# Patient Record
Sex: MALE | Race: WHITE | Employment: FULL TIME | URBAN - NONMETROPOLITAN AREA
[De-identification: names, ages, dates, MRNs, and addresses within clinical notes are randomized per-mention and may not be internally consistent; named-entity substitution may affect disease eponyms.]

---

## 2024-08-20 ENCOUNTER — APPOINTMENT (OUTPATIENT)
Dept: CT IMAGING | Age: 64
DRG: 392 | End: 2024-08-20
Payer: COMMERCIAL

## 2024-08-20 ENCOUNTER — HOSPITAL ENCOUNTER (INPATIENT)
Age: 64
LOS: 5 days | Discharge: HOME | DRG: 392 | End: 2024-08-25
Attending: STUDENT IN AN ORGANIZED HEALTH CARE EDUCATION/TRAINING PROGRAM
Payer: COMMERCIAL

## 2024-08-20 ENCOUNTER — APPOINTMENT (OUTPATIENT)
Dept: ULTRASOUND IMAGING | Age: 64
DRG: 392 | End: 2024-08-20
Payer: COMMERCIAL

## 2024-08-20 DIAGNOSIS — R19.7 NAUSEA VOMITING AND DIARRHEA: Primary | ICD-10-CM

## 2024-08-20 DIAGNOSIS — R11.2 NAUSEA VOMITING AND DIARRHEA: Primary | ICD-10-CM

## 2024-08-20 DIAGNOSIS — R17 JAUNDICE: ICD-10-CM

## 2024-08-20 DIAGNOSIS — K80.20 CALCULUS OF GALLBLADDER WITHOUT CHOLECYSTITIS WITHOUT OBSTRUCTION: ICD-10-CM

## 2024-08-20 DIAGNOSIS — E11.9 TYPE 2 DIABETES MELLITUS WITHOUT COMPLICATION, WITHOUT LONG-TERM CURRENT USE OF INSULIN (HCC): ICD-10-CM

## 2024-08-20 PROBLEM — E87.1 HYPONATREMIA: Status: ACTIVE | Noted: 2024-08-20

## 2024-08-20 LAB
ALBUMIN SERPL BCG-MCNC: 3.9 G/DL (ref 3.5–5.1)
ALP SERPL-CCNC: 203 U/L (ref 38–126)
ALT SERPL W/O P-5'-P-CCNC: 256 U/L (ref 11–66)
ANION GAP SERPL CALC-SCNC: 16 MEQ/L (ref 8–16)
ANISOCYTOSIS BLD QL SMEAR: PRESENT
APAP SERPL-MCNC: < 5 UG/ML (ref 0–20)
APTT PPP: 32.3 SECONDS (ref 22–38)
AST SERPL-CCNC: 81 U/L (ref 5–40)
BACTERIA URNS QL MICRO: ABNORMAL /HPF
BASOPHILS ABSOLUTE: 0 THOU/MM3 (ref 0–0.1)
BASOPHILS NFR BLD AUTO: 0.2 %
BILIRUB CONJ SERPL-MCNC: 3.8 MG/DL (ref 0.1–13.8)
BILIRUB SERPL-MCNC: 4.8 MG/DL (ref 0.3–1.2)
BILIRUB UR QL STRIP.AUTO: ABNORMAL
BUN SERPL-MCNC: 34 MG/DL (ref 7–22)
C CAYETANENSIS DNA SPEC QL NAA+PROBE: NOT DETECTED
CALCIUM SERPL-MCNC: 8.9 MG/DL (ref 8.5–10.5)
CAMPY SP DNA.DIARRHEA STL QL NAA+PROBE: NOT DETECTED
CASTS #/AREA URNS LPF: ABNORMAL /LPF
CASTS 2: ABNORMAL /LPF
CHARACTER UR: CLEAR
CHLORIDE SERPL-SCNC: 87 MEQ/L (ref 98–111)
CK SERPL-CCNC: 45 U/L (ref 55–170)
CO2 SERPL-SCNC: 23 MEQ/L (ref 23–33)
COLOR, UA: ABNORMAL
CREAT SERPL-MCNC: 1.8 MG/DL (ref 0.4–1.2)
CREAT UR-MCNC: 64.6 MG/DL
CREAT UR-MCNC: 64.7 MG/DL
CRP SERPL-MCNC: 22.5 MG/DL (ref 0–1)
CRYPTOSP DNA SPEC QL NAA+PROBE: NOT DETECTED
CRYSTALS URNS MICRO: ABNORMAL
DEPRECATED MEAN GLUCOSE BLD GHB EST-ACNC: 168 MG/DL (ref 70–126)
DEPRECATED RDW RBC AUTO: 34.9 FL (ref 35–45)
E COLI O157H7 DNA SPEC QL NAA+PROBE: NORMAL
E HISTOLYT DNA SPEC QL NAA+PROBE: NOT DETECTED
EAEC PAA PLAS AGGR+AATA ST NAA+NON-PRB: NOT DETECTED
EC STX1+STX2 + H7 FLIC SPEC NAA+PROBE: NOT DETECTED
EKG ATRIAL RATE: 84 BPM
EKG P AXIS: 17 DEGREES
EKG P-R INTERVAL: 148 MS
EKG Q-T INTERVAL: 414 MS
EKG QRS DURATION: 174 MS
EKG QTC CALCULATION (BAZETT): 489 MS
EKG R AXIS: 126 DEGREES
EKG T AXIS: 4 DEGREES
EKG VENTRICULAR RATE: 84 BPM
EOSINOPHIL NFR BLD AUTO: 0.4 %
EOSINOPHILS ABSOLUTE: 0.1 THOU/MM3 (ref 0–0.4)
EPEC EAE GENE STL QL NAA+NON-PROBE: NOT DETECTED
EPITHELIAL CELLS, UA: ABNORMAL /HPF
ERYTHROCYTE [DISTWIDTH] IN BLOOD BY AUTOMATED COUNT: 15.9 % (ref 11.5–14.5)
ETEC LTA+ST1A+ST1B TOX ST NAA+NON-PROBE: NOT DETECTED
FERRITIN SERPL IA-MCNC: 891 NG/ML (ref 22–322)
FLUAV RNA RESP QL NAA+PROBE: NOT DETECTED
FLUBV RNA RESP QL NAA+PROBE: NOT DETECTED
G LAMBLIA DNA SPEC QL NAA+PROBE: NOT DETECTED
GFR SERPL CREATININE-BSD FRML MDRD: 42 ML/MIN/1.73M2
GGT SERPL-CCNC: 196 U/L (ref 8–69)
GLUCOSE SERPL-MCNC: 299 MG/DL (ref 70–108)
GLUCOSE UR QL STRIP.AUTO: NEGATIVE MG/DL
HADV DNA SPEC QL NAA+PROBE: NOT DETECTED
HASTV RNA SPEC QL NAA+PROBE: NOT DETECTED
HAV IGM SER QL: NEGATIVE
HBA1C MFR BLD HPLC: 7.6 % (ref 4.4–6.4)
HBV CORE IGM SERPL QL IA: NEGATIVE
HBV SURFACE AG SERPL QL IA: NEGATIVE
HCT VFR BLD AUTO: 34.6 % (ref 42–52)
HCV IGG SERPL QL IA: NEGATIVE
HGB BLD-MCNC: 11.1 GM/DL (ref 14–18)
HGB UR QL STRIP.AUTO: NEGATIVE
IMM GRANULOCYTES # BLD AUTO: 0.06 THOU/MM3 (ref 0–0.07)
IMM GRANULOCYTES NFR BLD AUTO: 0.4 %
INR PPP: 1.31 (ref 0.85–1.13)
KETONES UR QL STRIP.AUTO: NEGATIVE
LACTIC ACID, SEPSIS: 2.5 MMOL/L (ref 0.5–1.9)
LDH SERPL L TO P-CCNC: 204 U/L (ref 100–190)
LIPASE SERPL-CCNC: 92.7 U/L (ref 5.6–51.3)
LIPASE SERPL-CCNC: 94.3 U/L (ref 5.6–51.3)
LYMPHOCYTES ABSOLUTE: 1 THOU/MM3 (ref 1–4.8)
LYMPHOCYTES NFR BLD AUTO: 7.2 %
MAGNESIUM SERPL-MCNC: 2.4 MG/DL (ref 1.6–2.4)
MCH RBC QN AUTO: 20.3 PG (ref 26–33)
MCHC RBC AUTO-ENTMCNC: 32.1 GM/DL (ref 32.2–35.5)
MCV RBC AUTO: 63.4 FL (ref 80–94)
MICROCYTES BLD QL SMEAR: PRESENT
MISCELLANEOUS 2: ABNORMAL
MONOCYTES ABSOLUTE: 1.1 THOU/MM3 (ref 0.4–1.3)
MONOCYTES NFR BLD AUTO: 7.5 %
NEUTROPHILS ABSOLUTE: 11.8 THOU/MM3 (ref 1.8–7.7)
NEUTROPHILS NFR BLD AUTO: 84.3 %
NITRITE UR QL STRIP: NEGATIVE
NOROVIRUS GI + GII RNA STL NAA+PROBE: NOT DETECTED
NRBC BLD AUTO-RTO: 0 /100 WBC
OSMOLALITY SERPL CALC.SUM OF ELEC: 272.1 MOSMOL/KG (ref 275–300)
OSMOLALITY SERPL: 288 MOSMOL/KG (ref 275–295)
OSMOLALITY UR: 314 MOSMOL/KG (ref 250–750)
P SHIGELLOIDES DNA STL QL NAA+PROBE: NOT DETECTED
PH UR STRIP.AUTO: 5.5 [PH] (ref 5–9)
PHOSPHATE SERPL-MCNC: 2.3 MG/DL (ref 2.4–4.7)
PLATELET # BLD AUTO: 209 THOU/MM3 (ref 130–400)
PMV BLD AUTO: 11.2 FL (ref 9.4–12.4)
POIKILOCYTES: ABNORMAL
POTASSIUM SERPL-SCNC: 4.1 MEQ/L (ref 3.5–5.2)
PROCALCITONIN SERPL IA-MCNC: 2.19 NG/ML (ref 0.01–0.09)
PROT SERPL-MCNC: 7.7 G/DL (ref 6.1–8)
PROT UR STRIP.AUTO-MCNC: ABNORMAL MG/DL
PROT UR-MCNC: 13.6 MG/DL
RBC # BLD AUTO: 5.46 MILL/MM3 (ref 4.7–6.1)
RBC URINE: ABNORMAL /HPF
RENAL EPI CELLS #/AREA URNS HPF: ABNORMAL /[HPF]
RV RNA SPEC QL NAA+PROBE: NOT DETECTED
SALICYLATES SERPL-MCNC: < 0.3 MG/DL (ref 2–10)
SALMONELLA DNA SPEC QL NAA+PROBE: NOT DETECTED
SAPOVIRUS RNA SPEC QL NAA+PROBE: NOT DETECTED
SARS-COV-2 RNA RESP QL NAA+PROBE: NOT DETECTED
SCAN OF BLOOD SMEAR: NORMAL
SHIGELLA SP+EIEC IPAH ST NAA+NON-PROBE: NOT DETECTED
SODIUM SERPL-SCNC: 126 MEQ/L (ref 135–145)
SODIUM SERPL-SCNC: 133 MEQ/L (ref 135–145)
SODIUM UR-SCNC: < 20 MEQ/L
SP GR UR REFRACT.AUTO: 1.03 (ref 1–1.03)
T4 FREE SERPL-MCNC: 1.2 NG/DL (ref 0.93–1.68)
TSH SERPL DL<=0.005 MIU/L-ACNC: 2.24 UIU/ML (ref 0.4–4.2)
UROBILINOGEN, URINE: 1 EU/DL (ref 0–1)
UUN 24H UR-MCNC: 500 MG/DL
V CHOLERAE DNA SPEC QL NAA+PROBE: NOT DETECTED
VIBRIO DNA SPEC NAA+PROBE: NOT DETECTED
WBC # BLD AUTO: 14 THOU/MM3 (ref 4.8–10.8)
WBC #/AREA URNS HPF: ABNORMAL /HPF
WBC #/AREA URNS HPF: NEGATIVE /[HPF]
Y ENTERO RECN STL QL NAA+PROBE: NOT DETECTED
YEAST LIKE FUNGI URNS QL MICRO: ABNORMAL

## 2024-08-20 PROCEDURE — 83605 ASSAY OF LACTIC ACID: CPT

## 2024-08-20 PROCEDURE — 2500000003 HC RX 250 WO HCPCS

## 2024-08-20 PROCEDURE — 2580000003 HC RX 258: Performed by: EMERGENCY MEDICINE

## 2024-08-20 PROCEDURE — 93010 ELECTROCARDIOGRAM REPORT: CPT | Performed by: INTERNAL MEDICINE

## 2024-08-20 PROCEDURE — 82728 ASSAY OF FERRITIN: CPT

## 2024-08-20 PROCEDURE — 83690 ASSAY OF LIPASE: CPT

## 2024-08-20 PROCEDURE — 81001 URINALYSIS AUTO W/SCOPE: CPT

## 2024-08-20 PROCEDURE — 87040 BLOOD CULTURE FOR BACTERIA: CPT

## 2024-08-20 PROCEDURE — 84145 PROCALCITONIN (PCT): CPT

## 2024-08-20 PROCEDURE — 6360000002 HC RX W HCPCS: Performed by: EMERGENCY MEDICINE

## 2024-08-20 PROCEDURE — 87086 URINE CULTURE/COLONY COUNT: CPT

## 2024-08-20 PROCEDURE — 2060000000 HC ICU INTERMEDIATE R&B

## 2024-08-20 PROCEDURE — 80179 DRUG ASSAY SALICYLATE: CPT

## 2024-08-20 PROCEDURE — 82570 ASSAY OF URINE CREATININE: CPT

## 2024-08-20 PROCEDURE — 84540 ASSAY OF URINE/UREA-N: CPT

## 2024-08-20 PROCEDURE — 99205 OFFICE O/P NEW HI 60 MIN: CPT

## 2024-08-20 PROCEDURE — 93005 ELECTROCARDIOGRAM TRACING: CPT | Performed by: STUDENT IN AN ORGANIZED HEALTH CARE EDUCATION/TRAINING PROGRAM

## 2024-08-20 PROCEDURE — 83036 HEMOGLOBIN GLYCOSYLATED A1C: CPT

## 2024-08-20 PROCEDURE — 6370000000 HC RX 637 (ALT 250 FOR IP): Performed by: STUDENT IN AN ORGANIZED HEALTH CARE EDUCATION/TRAINING PROGRAM

## 2024-08-20 PROCEDURE — 6360000004 HC RX CONTRAST MEDICATION: Performed by: STUDENT IN AN ORGANIZED HEALTH CARE EDUCATION/TRAINING PROGRAM

## 2024-08-20 PROCEDURE — 86140 C-REACTIVE PROTEIN: CPT

## 2024-08-20 PROCEDURE — 85025 COMPLETE CBC W/AUTO DIFF WBC: CPT

## 2024-08-20 PROCEDURE — 99223 1ST HOSP IP/OBS HIGH 75: CPT | Performed by: STUDENT IN AN ORGANIZED HEALTH CARE EDUCATION/TRAINING PROGRAM

## 2024-08-20 PROCEDURE — 83615 LACTATE (LD) (LDH) ENZYME: CPT

## 2024-08-20 PROCEDURE — 80143 DRUG ASSAY ACETAMINOPHEN: CPT

## 2024-08-20 PROCEDURE — 83930 ASSAY OF BLOOD OSMOLALITY: CPT

## 2024-08-20 PROCEDURE — 83735 ASSAY OF MAGNESIUM: CPT

## 2024-08-20 PROCEDURE — 99204 OFFICE O/P NEW MOD 45 MIN: CPT

## 2024-08-20 PROCEDURE — 84439 ASSAY OF FREE THYROXINE: CPT

## 2024-08-20 PROCEDURE — 84300 ASSAY OF URINE SODIUM: CPT

## 2024-08-20 PROCEDURE — 2580000003 HC RX 258: Performed by: STUDENT IN AN ORGANIZED HEALTH CARE EDUCATION/TRAINING PROGRAM

## 2024-08-20 PROCEDURE — 84443 ASSAY THYROID STIM HORMONE: CPT

## 2024-08-20 PROCEDURE — 84156 ASSAY OF PROTEIN URINE: CPT

## 2024-08-20 PROCEDURE — 87636 SARSCOV2 & INF A&B AMP PRB: CPT

## 2024-08-20 PROCEDURE — 84100 ASSAY OF PHOSPHORUS: CPT

## 2024-08-20 PROCEDURE — 99285 EMERGENCY DEPT VISIT HI MDM: CPT

## 2024-08-20 PROCEDURE — 76705 ECHO EXAM OF ABDOMEN: CPT

## 2024-08-20 PROCEDURE — 82248 BILIRUBIN DIRECT: CPT

## 2024-08-20 PROCEDURE — 74177 CT ABD & PELVIS W/CONTRAST: CPT

## 2024-08-20 PROCEDURE — 83935 ASSAY OF URINE OSMOLALITY: CPT

## 2024-08-20 PROCEDURE — 36415 COLL VENOUS BLD VENIPUNCTURE: CPT

## 2024-08-20 PROCEDURE — 82550 ASSAY OF CK (CPK): CPT

## 2024-08-20 PROCEDURE — 87641 MR-STAPH DNA AMP PROBE: CPT

## 2024-08-20 PROCEDURE — 85730 THROMBOPLASTIN TIME PARTIAL: CPT

## 2024-08-20 PROCEDURE — 6360000002 HC RX W HCPCS: Performed by: STUDENT IN AN ORGANIZED HEALTH CARE EDUCATION/TRAINING PROGRAM

## 2024-08-20 PROCEDURE — 87507 IADNA-DNA/RNA PROBE TQ 12-25: CPT

## 2024-08-20 PROCEDURE — 80053 COMPREHEN METABOLIC PANEL: CPT

## 2024-08-20 PROCEDURE — 84295 ASSAY OF SERUM SODIUM: CPT

## 2024-08-20 PROCEDURE — 80074 ACUTE HEPATITIS PANEL: CPT

## 2024-08-20 PROCEDURE — 82977 ASSAY OF GGT: CPT

## 2024-08-20 PROCEDURE — 85610 PROTHROMBIN TIME: CPT

## 2024-08-20 PROCEDURE — 2580000003 HC RX 258

## 2024-08-20 PROCEDURE — 83010 ASSAY OF HAPTOGLOBIN QUANT: CPT

## 2024-08-20 RX ORDER — SODIUM CHLORIDE 9 MG/ML
INJECTION, SOLUTION INTRAVENOUS CONTINUOUS
Status: DISCONTINUED | OUTPATIENT
Start: 2024-08-20 | End: 2024-08-20

## 2024-08-20 RX ORDER — METRONIDAZOLE 500 MG/100ML
500 INJECTION, SOLUTION INTRAVENOUS ONCE
Status: COMPLETED | OUTPATIENT
Start: 2024-08-20 | End: 2024-08-20

## 2024-08-20 RX ORDER — IOPAMIDOL 755 MG/ML
80 INJECTION, SOLUTION INTRAVASCULAR
Status: COMPLETED | OUTPATIENT
Start: 2024-08-20 | End: 2024-08-20

## 2024-08-20 RX ORDER — ONDANSETRON 4 MG/1
4 TABLET, ORALLY DISINTEGRATING ORAL EVERY 8 HOURS PRN
Status: DISCONTINUED | OUTPATIENT
Start: 2024-08-20 | End: 2024-08-25 | Stop reason: HOSPADM

## 2024-08-20 RX ORDER — POTASSIUM CHLORIDE 1500 MG/1
40 TABLET, EXTENDED RELEASE ORAL PRN
Status: DISCONTINUED | OUTPATIENT
Start: 2024-08-20 | End: 2024-08-25 | Stop reason: HOSPADM

## 2024-08-20 RX ORDER — ACETAMINOPHEN 650 MG/1
650 SUPPOSITORY RECTAL EVERY 6 HOURS PRN
Status: DISCONTINUED | OUTPATIENT
Start: 2024-08-20 | End: 2024-08-25 | Stop reason: HOSPADM

## 2024-08-20 RX ORDER — ACETAMINOPHEN 325 MG/1
650 TABLET ORAL EVERY 6 HOURS PRN
Status: DISCONTINUED | OUTPATIENT
Start: 2024-08-20 | End: 2024-08-25 | Stop reason: HOSPADM

## 2024-08-20 RX ORDER — ENOXAPARIN SODIUM 100 MG/ML
40 INJECTION SUBCUTANEOUS EVERY 24 HOURS
Status: DISCONTINUED | OUTPATIENT
Start: 2024-08-20 | End: 2024-08-25 | Stop reason: HOSPADM

## 2024-08-20 RX ORDER — 0.9 % SODIUM CHLORIDE 0.9 %
3000 INTRAVENOUS SOLUTION INTRAVENOUS ONCE
Status: COMPLETED | OUTPATIENT
Start: 2024-08-20 | End: 2024-08-20

## 2024-08-20 RX ORDER — SODIUM CHLORIDE 0.9 % (FLUSH) 0.9 %
5-40 SYRINGE (ML) INJECTION EVERY 12 HOURS SCHEDULED
Status: DISCONTINUED | OUTPATIENT
Start: 2024-08-20 | End: 2024-08-25 | Stop reason: HOSPADM

## 2024-08-20 RX ORDER — SODIUM CHLORIDE 9 MG/ML
INJECTION, SOLUTION INTRAVENOUS PRN
Status: DISCONTINUED | OUTPATIENT
Start: 2024-08-20 | End: 2024-08-25 | Stop reason: HOSPADM

## 2024-08-20 RX ORDER — SODIUM CHLORIDE 0.9 % (FLUSH) 0.9 %
5-40 SYRINGE (ML) INJECTION PRN
Status: DISCONTINUED | OUTPATIENT
Start: 2024-08-20 | End: 2024-08-25 | Stop reason: HOSPADM

## 2024-08-20 RX ORDER — ONDANSETRON 2 MG/ML
4 INJECTION INTRAMUSCULAR; INTRAVENOUS EVERY 6 HOURS PRN
Status: DISCONTINUED | OUTPATIENT
Start: 2024-08-20 | End: 2024-08-25 | Stop reason: HOSPADM

## 2024-08-20 RX ORDER — DEXTROSE MONOHYDRATE 50 MG/ML
INJECTION, SOLUTION INTRAVENOUS CONTINUOUS
Status: DISCONTINUED | OUTPATIENT
Start: 2024-08-20 | End: 2024-08-21

## 2024-08-20 RX ORDER — METRONIDAZOLE 500 MG/100ML
500 INJECTION, SOLUTION INTRAVENOUS EVERY 8 HOURS
Status: DISCONTINUED | OUTPATIENT
Start: 2024-08-21 | End: 2024-08-25 | Stop reason: HOSPADM

## 2024-08-20 RX ORDER — POLYETHYLENE GLYCOL 3350 17 G/17G
17 POWDER, FOR SOLUTION ORAL DAILY PRN
Status: DISCONTINUED | OUTPATIENT
Start: 2024-08-20 | End: 2024-08-25 | Stop reason: HOSPADM

## 2024-08-20 RX ORDER — POTASSIUM CHLORIDE 7.45 MG/ML
10 INJECTION INTRAVENOUS PRN
Status: DISCONTINUED | OUTPATIENT
Start: 2024-08-20 | End: 2024-08-25 | Stop reason: HOSPADM

## 2024-08-20 RX ORDER — MAGNESIUM SULFATE IN WATER 40 MG/ML
2000 INJECTION, SOLUTION INTRAVENOUS PRN
Status: DISCONTINUED | OUTPATIENT
Start: 2024-08-20 | End: 2024-08-25 | Stop reason: HOSPADM

## 2024-08-20 RX ORDER — 0.9 % SODIUM CHLORIDE 0.9 %
30 INTRAVENOUS SOLUTION INTRAVENOUS ONCE
Status: DISCONTINUED | OUTPATIENT
Start: 2024-08-20 | End: 2024-08-20

## 2024-08-20 RX ORDER — MORPHINE SULFATE 4 MG/ML
4 INJECTION, SOLUTION INTRAMUSCULAR; INTRAVENOUS ONCE
Status: DISCONTINUED | OUTPATIENT
Start: 2024-08-20 | End: 2024-08-25 | Stop reason: HOSPADM

## 2024-08-20 RX ADMIN — ENOXAPARIN SODIUM 40 MG: 100 INJECTION SUBCUTANEOUS at 20:24

## 2024-08-20 RX ADMIN — IOPAMIDOL 80 ML: 755 INJECTION, SOLUTION INTRAVENOUS at 15:36

## 2024-08-20 RX ADMIN — POTASSIUM PHOSPHATE, MONOBASIC POTASSIUM PHOSPHATE, DIBASIC 10 MMOL: 224; 236 INJECTION, SOLUTION, CONCENTRATE INTRAVENOUS at 23:42

## 2024-08-20 RX ADMIN — CEFTRIAXONE SODIUM 2000 MG: 2 INJECTION, POWDER, FOR SOLUTION INTRAMUSCULAR; INTRAVENOUS at 20:23

## 2024-08-20 RX ADMIN — METRONIDAZOLE 500 MG: 500 INJECTION, SOLUTION INTRAVENOUS at 18:14

## 2024-08-20 RX ADMIN — DEXTROSE MONOHYDRATE: 50 INJECTION, SOLUTION INTRAVENOUS at 22:16

## 2024-08-20 RX ADMIN — ACETAMINOPHEN 650 MG: 325 TABLET ORAL at 23:12

## 2024-08-20 RX ADMIN — SODIUM CHLORIDE: 9 INJECTION, SOLUTION INTRAVENOUS at 19:01

## 2024-08-20 RX ADMIN — SODIUM CHLORIDE 1000 ML: 9 INJECTION, SOLUTION INTRAVENOUS at 15:21

## 2024-08-20 ASSESSMENT — PAIN - FUNCTIONAL ASSESSMENT: PAIN_FUNCTIONAL_ASSESSMENT: NONE - DENIES PAIN

## 2024-08-20 ASSESSMENT — PAIN DESCRIPTION - LOCATION: LOCATION: HEAD

## 2024-08-20 ASSESSMENT — PAIN DESCRIPTION - DESCRIPTORS: DESCRIPTORS: ACHING

## 2024-08-20 ASSESSMENT — PAIN SCALES - GENERAL
PAINLEVEL_OUTOF10: 3
PAINLEVEL_OUTOF10: 0

## 2024-08-20 NOTE — ED NOTES
Pt transferred to [facility]. Pt in stable condition. Floor staff notified.

## 2024-08-20 NOTE — ED NOTES
ED to inpatient nurses report      Chief Complaint:  Chief Complaint   Patient presents with    Emesis    Diarrhea    Jaundice     Present to ED from: home    MOA:     LOC: alert and orientated to name, place, date  Mobility: Independent  Oxygen Baseline: RA    Current needs required: RA     Code Status:   Full Code    What abnormal results were found and what did you give/do to treat them?   NVD  Jaundice  Thickening of gallbladder wall with elevated CRP, lipase, and Procalcitonin > IV fluids, Flagyl  (NEEDS 1 more L NS - received 2L in ER. NEEDS Rocephin)     Mental Status:  Level of Consciousness: Alert (0)    Psych Assessment:        Vitals:  Patient Vitals for the past 24 hrs:   BP Temp Temp src Pulse Resp SpO2 Height Weight   08/20/24 1815 (!) 189/89 -- -- 94 16 98 % -- --   08/20/24 1702 (!) 156/79 -- -- 87 16 97 % -- --   08/20/24 1522 (!) 141/78 -- -- 90 16 98 % -- --   08/20/24 1419 (!) 174/98 98.2 °F (36.8 °C) Oral 97 17 98 % 1.778 m (5' 10\") 86.6 kg (191 lb)        LDAs:   Peripheral IV 08/20/24 Right Antecubital (Active)   Site Assessment Clean, dry & intact 08/20/24 1523   Line Status Infusing 08/20/24 1523   Phlebitis Assessment No symptoms 08/20/24 1523   Infiltration Assessment 0 08/20/24 1523   Dressing Status Clean, dry & intact 08/20/24 1424       Ambulatory Status:  No data recorded    Diagnosis:  DISPOSITION Admitted 08/20/2024 06:11:13 PM   Final diagnoses:   Nausea vomiting and diarrhea   Jaundice   Calculus of gallbladder without cholecystitis without obstruction        Consults:  None     Pain Score:  Pain Assessment  Pain Assessment: None - Denies Pain    C-SSRS:   Risk of Suicide: No Risk    Sepsis Screening:       [place] Fall Risk:       Swallow Screening        Preferred Language:   English      ALLERGIES     Patient has no known allergies.    SURGICAL HISTORY     History reviewed. No pertinent surgical history.    PAST MEDICAL HISTORY     History reviewed. No pertinent past medical  history.        Electronically signed by Yamilka Mesa RN on 8/20/2024 at 6:23 PM

## 2024-08-20 NOTE — ED PROVIDER NOTES
Pt Name: Damian Vargas  MRN: [id number]  Birthdate 1960  Date of evaluation: 8/20/2024  ED Resident: Awa Jones MD  ED Attending:      CHIEF COMPLAINT       Chief Complaint   Patient presents with    Emesis    Diarrhea    Jaundice     History obtained from the patient.    HISTORY OF PRESENT ILLNESS    HPI  Damian Vargas is a 64 y.o. male who presents to the emergency department for evaluation of nausea vomiting, jaundice, diarrhea.    Patient states that he began feeling unwell on Saturday, Sunday was vomiting, has not been able to eat or drink anything, and now has diarrhea.  He went to urgent care, urgent care noticed that he appeared jaundiced and sent him to the ED for evaluation.  Patient denies recent travel, fevers, but he has had aches and chills, felt warm, denies alcohol consumption for the past 20 years, non-smoker, no surgical history.  No significant medical history.    The patient has no other acute complaints at this time.    PAST MEDICAL AND SURGICAL HISTORY   History reviewed. No pertinent past medical history.  History reviewed. No pertinent surgical history.    MEDICATIONS     Current Facility-Administered Medications:     perflutren lipid microspheres (DEFINITY) injection 1.5 mL, 1.5 mL, IntraVENous, ONCE PRN, Eileen Mena MD    morphine (PF) injection 4 mg, 4 mg, IntraVENous, Once, Awa Jones MD    sodium chloride flush 0.9 % injection 5-40 mL, 5-40 mL, IntraVENous, 2 times per day, Eileen Mena MD    sodium chloride flush 0.9 % injection 5-40 mL, 5-40 mL, IntraVENous, PRN, Eileen Mena MD    0.9 % sodium chloride infusion, , IntraVENous, PRN, Eileen Mena MD    potassium chloride (KLOR-CON M) extended release tablet 40 mEq, 40 mEq, Oral, PRN **OR** potassium bicarb-citric acid (EFFER-K) effervescent tablet 40 mEq, 40 mEq, Oral, PRN **OR** potassium chloride 10 mEq/100 mL IVPB (Peripheral Line), 10 mEq, IntraVENous, PRN, Eileen Mena

## 2024-08-20 NOTE — ED TRIAGE NOTES
Pt presents to the ED from  with complaints of being a little jaundice. Pt states he did not notice his eye color change. Pt denies any symptoms, denies any pain right now. States he is currently getting over a stomach bug. Has been vomiting and having diarrhea for the past few days. VSS on arrival.

## 2024-08-21 ENCOUNTER — APPOINTMENT (OUTPATIENT)
Age: 64
DRG: 392 | End: 2024-08-21
Attending: STUDENT IN AN ORGANIZED HEALTH CARE EDUCATION/TRAINING PROGRAM
Payer: COMMERCIAL

## 2024-08-21 ENCOUNTER — APPOINTMENT (OUTPATIENT)
Dept: MRI IMAGING | Age: 64
DRG: 392 | End: 2024-08-21
Payer: COMMERCIAL

## 2024-08-21 ENCOUNTER — APPOINTMENT (OUTPATIENT)
Dept: NUCLEAR MEDICINE | Age: 64
DRG: 392 | End: 2024-08-21
Payer: COMMERCIAL

## 2024-08-21 PROBLEM — E11.9 TYPE 2 DIABETES MELLITUS WITHOUT COMPLICATION, WITHOUT LONG-TERM CURRENT USE OF INSULIN (HCC): Status: ACTIVE | Noted: 2024-08-21

## 2024-08-21 PROBLEM — R19.7 NAUSEA VOMITING AND DIARRHEA: Status: ACTIVE | Noted: 2024-08-21

## 2024-08-21 PROBLEM — K75.89 CHOLESTATIC HEPATITIS: Status: ACTIVE | Noted: 2024-08-21

## 2024-08-21 PROBLEM — N17.9 AKI (ACUTE KIDNEY INJURY) (HCC): Status: ACTIVE | Noted: 2024-08-21

## 2024-08-21 PROBLEM — R11.2 NAUSEA VOMITING AND DIARRHEA: Status: ACTIVE | Noted: 2024-08-21

## 2024-08-21 LAB
ALBUMIN SERPL BCG-MCNC: 3.2 G/DL (ref 3.5–5.1)
ALP SERPL-CCNC: 157 U/L (ref 38–126)
ALT SERPL W/O P-5'-P-CCNC: 166 U/L (ref 11–66)
ANION GAP SERPL CALC-SCNC: 8 MEQ/L (ref 8–16)
AST SERPL-CCNC: 46 U/L (ref 5–40)
BASOPHILS ABSOLUTE: 0 THOU/MM3 (ref 0–0.1)
BASOPHILS NFR BLD AUTO: 0.2 %
BILIRUB SERPL-MCNC: 2.5 MG/DL (ref 0.3–1.2)
BUN SERPL-MCNC: 26 MG/DL (ref 7–22)
CALCIUM SERPL-MCNC: 8.1 MG/DL (ref 8.5–10.5)
CHLORIDE SERPL-SCNC: 97 MEQ/L (ref 98–111)
CHOLEST SERPL-MCNC: 124 MG/DL (ref 100–199)
CO2 SERPL-SCNC: 27 MEQ/L (ref 23–33)
CORTIS SERPL-MCNC: 7.81 UG/DL
CORTISOL COLLECTION INFO: NORMAL
CREAT SERPL-MCNC: 1.1 MG/DL (ref 0.4–1.2)
DEPRECATED MEAN GLUCOSE BLD GHB EST-ACNC: 168 MG/DL (ref 70–126)
DEPRECATED RDW RBC AUTO: 35.1 FL (ref 35–45)
ECHO AO ASC DIAM: 3.6 CM
ECHO AO ASCENDING AORTA INDEX: 1.74 CM/M2
ECHO AO SINUS VALSALVA DIAM: 3.5 CM
ECHO AO SINUS VALSALVA INDEX: 1.69 CM/M2
ECHO AO ST JNCT DIAM: 2.8 CM
ECHO AR MAX VEL PISA: 4.3 M/S
ECHO AV AREA PEAK VELOCITY: 1.5 CM2
ECHO AV AREA VTI: 1.8 CM2
ECHO AV AREA/BSA PEAK VELOCITY: 0.7 CM2/M2
ECHO AV AREA/BSA VTI: 0.9 CM2/M2
ECHO AV CUSP MM: 1.7 CM
ECHO AV MEAN GRADIENT: 11 MMHG
ECHO AV MEAN VELOCITY: 1.5 M/S
ECHO AV PEAK GRADIENT: 19 MMHG
ECHO AV PEAK VELOCITY: 2.2 M/S
ECHO AV REGURGITANT PHT: 615 MS
ECHO AV VELOCITY RATIO: 0.5
ECHO AV VTI: 39.2 CM
ECHO BSA: 2.1 M2
ECHO EST RA PRESSURE: 5 MMHG
ECHO LA AREA 2C: 20.8 CM2
ECHO LA AREA 4C: 22.2 CM2
ECHO LA DIAMETER INDEX: 2.13 CM/M2
ECHO LA DIAMETER: 4.4 CM
ECHO LA MAJOR AXIS: 5.9 CM
ECHO LA MINOR AXIS: 5.7 CM
ECHO LA VOL BP: 67 ML (ref 18–58)
ECHO LA VOL MOD A2C: 63 ML (ref 18–58)
ECHO LA VOL MOD A4C: 69 ML (ref 18–58)
ECHO LA VOL/BSA BIPLANE: 32 ML/M2 (ref 16–34)
ECHO LA VOLUME INDEX MOD A2C: 30 ML/M2 (ref 16–34)
ECHO LA VOLUME INDEX MOD A4C: 33 ML/M2 (ref 16–34)
ECHO LV E' LATERAL VELOCITY: 8 CM/S
ECHO LV E' SEPTAL VELOCITY: 7 CM/S
ECHO LV EF PHYSICIAN: 50 %
ECHO LV FRACTIONAL SHORTENING: 28 % (ref 28–44)
ECHO LV INTERNAL DIMENSION DIASTOLE INDEX: 2.22 CM/M2
ECHO LV INTERNAL DIMENSION DIASTOLIC: 4.6 CM (ref 4.2–5.9)
ECHO LV INTERNAL DIMENSION SYSTOLIC INDEX: 1.59 CM/M2
ECHO LV INTERNAL DIMENSION SYSTOLIC: 3.3 CM
ECHO LV ISOVOLUMETRIC RELAXATION TIME (IVRT): 77 MS
ECHO LV IVSD: 1.7 CM (ref 0.6–1)
ECHO LV MASS 2D: 299.5 G (ref 88–224)
ECHO LV MASS INDEX 2D: 144.7 G/M2 (ref 49–115)
ECHO LV POSTERIOR WALL DIASTOLIC: 1.4 CM (ref 0.6–1)
ECHO LV RELATIVE WALL THICKNESS RATIO: 0.61
ECHO LVOT AREA: 3.1 CM2
ECHO LVOT AV VTI INDEX: 0.56
ECHO LVOT DIAM: 2 CM
ECHO LVOT MEAN GRADIENT: 3 MMHG
ECHO LVOT PEAK GRADIENT: 5 MMHG
ECHO LVOT PEAK VELOCITY: 1.1 M/S
ECHO LVOT STROKE VOLUME INDEX: 33.1 ML/M2
ECHO LVOT SV: 68.5 ML
ECHO LVOT VTI: 21.8 CM
ECHO MV A VELOCITY: 1.14 M/S
ECHO MV E DECELERATION TIME (DT): 182 MS
ECHO MV E VELOCITY: 0.95 M/S
ECHO MV E/A RATIO: 0.83
ECHO MV E/E' LATERAL: 11.88
ECHO MV E/E' RATIO (AVERAGED): 12.72
ECHO MV E/E' SEPTAL: 13.57
ECHO MV REGURGITANT PEAK GRADIENT: 117 MMHG
ECHO MV REGURGITANT PEAK VELOCITY: 5.4 M/S
ECHO PV MAX VELOCITY: 1.3 M/S
ECHO PV PEAK GRADIENT: 6 MMHG
ECHO RIGHT VENTRICULAR SYSTOLIC PRESSURE (RVSP): 25 MMHG
ECHO RV FREE WALL PEAK S': 14 CM/S
ECHO RV INTERNAL DIMENSION: 2.8 CM
ECHO RV TAPSE: 2.5 CM (ref 1.7–?)
ECHO TV E WAVE: 0.6 M/S
ECHO TV REGURGITANT MAX VELOCITY: 2.25 M/S
ECHO TV REGURGITANT PEAK GRADIENT: 20 MMHG
EOSINOPHIL NFR BLD AUTO: 2.6 %
EOSINOPHILS ABSOLUTE: 0.2 THOU/MM3 (ref 0–0.4)
ERYTHROCYTE [DISTWIDTH] IN BLOOD BY AUTOMATED COUNT: 15.7 % (ref 11.5–14.5)
GFR SERPL CREATININE-BSD FRML MDRD: 76 ML/MIN/1.73M2
GLUCOSE BLD STRIP.AUTO-MCNC: 177 MG/DL (ref 70–108)
GLUCOSE BLD STRIP.AUTO-MCNC: 215 MG/DL (ref 70–108)
GLUCOSE BLD STRIP.AUTO-MCNC: 258 MG/DL (ref 70–108)
GLUCOSE SERPL-MCNC: 227 MG/DL (ref 70–108)
HBA1C MFR BLD HPLC: 7.6 % (ref 4.4–6.4)
HCT VFR BLD AUTO: 27.3 % (ref 42–52)
HDLC SERPL-MCNC: 22 MG/DL
HGB BLD-MCNC: 8.9 GM/DL (ref 14–18)
IMM GRANULOCYTES # BLD AUTO: 0.03 THOU/MM3 (ref 0–0.07)
IMM GRANULOCYTES NFR BLD AUTO: 0.4 %
INR PPP: 1.23 (ref 0.85–1.13)
LACTATE SERPL-SCNC: 1 MMOL/L (ref 0.5–2)
LACTATE SERPL-SCNC: 1.1 MMOL/L (ref 0.5–2)
LACTATE SERPL-SCNC: 1.1 MMOL/L (ref 0.5–2)
LACTATE SERPL-SCNC: 1.3 MMOL/L (ref 0.5–2)
LDLC SERPL CALC-MCNC: 56 MG/DL
LYMPHOCYTES ABSOLUTE: 0.9 THOU/MM3 (ref 1–4.8)
LYMPHOCYTES NFR BLD AUTO: 10.9 %
MAGNESIUM SERPL-MCNC: 2.4 MG/DL (ref 1.6–2.4)
MCH RBC QN AUTO: 20.5 PG (ref 26–33)
MCHC RBC AUTO-ENTMCNC: 32.6 GM/DL (ref 32.2–35.5)
MCV RBC AUTO: 62.9 FL (ref 80–94)
MICROCYTES BLD QL SMEAR: PRESENT
MONOCYTES ABSOLUTE: 0.9 THOU/MM3 (ref 0.4–1.3)
MONOCYTES NFR BLD AUTO: 10.2 %
MRSA DNA SPEC QL NAA+PROBE: NEGATIVE
NEUTROPHILS ABSOLUTE: 6.4 THOU/MM3 (ref 1.8–7.7)
NEUTROPHILS NFR BLD AUTO: 75.7 %
NRBC BLD AUTO-RTO: 0 /100 WBC
PATHOLOGIST REVIEW: ABNORMAL
PHOSPHATE SERPL-MCNC: 2.6 MG/DL (ref 2.4–4.7)
PLATELET # BLD AUTO: 189 THOU/MM3 (ref 130–400)
PLATELET BLD QL SMEAR: ADEQUATE
PMV BLD AUTO: 10.8 FL (ref 9.4–12.4)
POIKILOCYTES: ABNORMAL
POTASSIUM SERPL-SCNC: 4.3 MEQ/L (ref 3.5–5.2)
PROT SERPL-MCNC: 6.2 G/DL (ref 6.1–8)
RBC # BLD AUTO: 4.34 MILL/MM3 (ref 4.7–6.1)
SCAN OF BLOOD SMEAR: NORMAL
SODIUM SERPL-SCNC: 132 MEQ/L (ref 135–145)
SODIUM SERPL-SCNC: 133 MEQ/L (ref 135–145)
SODIUM SERPL-SCNC: 134 MEQ/L (ref 135–145)
SODIUM SERPL-SCNC: 135 MEQ/L (ref 135–145)
SODIUM SERPL-SCNC: 136 MEQ/L (ref 135–145)
TRIGL SERPL-MCNC: 230 MG/DL (ref 0–199)
WBC # BLD AUTO: 8.5 THOU/MM3 (ref 4.8–10.8)

## 2024-08-21 PROCEDURE — 80061 LIPID PANEL: CPT

## 2024-08-21 PROCEDURE — 6360000004 HC RX CONTRAST MEDICATION: Performed by: STUDENT IN AN ORGANIZED HEALTH CARE EDUCATION/TRAINING PROGRAM

## 2024-08-21 PROCEDURE — 3430000000 HC RX DIAGNOSTIC RADIOPHARMACEUTICAL: Performed by: STUDENT IN AN ORGANIZED HEALTH CARE EDUCATION/TRAINING PROGRAM

## 2024-08-21 PROCEDURE — 83516 IMMUNOASSAY NONANTIBODY: CPT

## 2024-08-21 PROCEDURE — A9579 GAD-BASE MR CONTRAST NOS,1ML: HCPCS | Performed by: STUDENT IN AN ORGANIZED HEALTH CARE EDUCATION/TRAINING PROGRAM

## 2024-08-21 PROCEDURE — 84100 ASSAY OF PHOSPHORUS: CPT

## 2024-08-21 PROCEDURE — 6370000000 HC RX 637 (ALT 250 FOR IP): Performed by: INTERNAL MEDICINE

## 2024-08-21 PROCEDURE — 99233 SBSQ HOSP IP/OBS HIGH 50: CPT | Performed by: INTERNAL MEDICINE

## 2024-08-21 PROCEDURE — 83605 ASSAY OF LACTIC ACID: CPT

## 2024-08-21 PROCEDURE — 6360000002 HC RX W HCPCS: Performed by: RADIOLOGY

## 2024-08-21 PROCEDURE — 6360000002 HC RX W HCPCS: Performed by: STUDENT IN AN ORGANIZED HEALTH CARE EDUCATION/TRAINING PROGRAM

## 2024-08-21 PROCEDURE — 82533 TOTAL CORTISOL: CPT

## 2024-08-21 PROCEDURE — 83735 ASSAY OF MAGNESIUM: CPT

## 2024-08-21 PROCEDURE — C8929 TTE W OR WO FOL WCON,DOPPLER: HCPCS

## 2024-08-21 PROCEDURE — 85610 PROTHROMBIN TIME: CPT

## 2024-08-21 PROCEDURE — 6370000000 HC RX 637 (ALT 250 FOR IP)

## 2024-08-21 PROCEDURE — 74183 MRI ABD W/O CNTR FLWD CNTR: CPT

## 2024-08-21 PROCEDURE — A9537 TC99M MEBROFENIN: HCPCS | Performed by: STUDENT IN AN ORGANIZED HEALTH CARE EDUCATION/TRAINING PROGRAM

## 2024-08-21 PROCEDURE — 82948 REAGENT STRIP/BLOOD GLUCOSE: CPT

## 2024-08-21 PROCEDURE — 93306 TTE W/DOPPLER COMPLETE: CPT | Performed by: NUCLEAR MEDICINE

## 2024-08-21 PROCEDURE — 78227 HEPATOBIL SYST IMAGE W/DRUG: CPT

## 2024-08-21 PROCEDURE — 2060000000 HC ICU INTERMEDIATE R&B

## 2024-08-21 PROCEDURE — 36415 COLL VENOUS BLD VENIPUNCTURE: CPT

## 2024-08-21 PROCEDURE — 83036 HEMOGLOBIN GLYCOSYLATED A1C: CPT

## 2024-08-21 PROCEDURE — 80053 COMPREHEN METABOLIC PANEL: CPT

## 2024-08-21 PROCEDURE — 2580000003 HC RX 258: Performed by: STUDENT IN AN ORGANIZED HEALTH CARE EDUCATION/TRAINING PROGRAM

## 2024-08-21 PROCEDURE — 84295 ASSAY OF SERUM SODIUM: CPT

## 2024-08-21 PROCEDURE — 85025 COMPLETE CBC W/AUTO DIFF WBC: CPT

## 2024-08-21 RX ORDER — DEXTROSE MONOHYDRATE 100 MG/ML
INJECTION, SOLUTION INTRAVENOUS CONTINUOUS PRN
Status: DISCONTINUED | OUTPATIENT
Start: 2024-08-21 | End: 2024-08-25 | Stop reason: HOSPADM

## 2024-08-21 RX ORDER — INSULIN LISPRO 100 [IU]/ML
0-4 INJECTION, SOLUTION INTRAVENOUS; SUBCUTANEOUS
Status: DISCONTINUED | OUTPATIENT
Start: 2024-08-21 | End: 2024-08-25 | Stop reason: HOSPADM

## 2024-08-21 RX ORDER — INSULIN LISPRO 100 [IU]/ML
0-4 INJECTION, SOLUTION INTRAVENOUS; SUBCUTANEOUS NIGHTLY
Status: DISCONTINUED | OUTPATIENT
Start: 2024-08-21 | End: 2024-08-25 | Stop reason: HOSPADM

## 2024-08-21 RX ORDER — AMLODIPINE BESYLATE 5 MG/1
5 TABLET ORAL DAILY
Status: DISCONTINUED | OUTPATIENT
Start: 2024-08-21 | End: 2024-08-22

## 2024-08-21 RX ORDER — MORPHINE SULFATE 4 MG/ML
3.6 INJECTION, SOLUTION INTRAMUSCULAR; INTRAVENOUS ONCE
Status: COMPLETED | OUTPATIENT
Start: 2024-08-21 | End: 2024-08-21

## 2024-08-21 RX ORDER — GLUCAGON 1 MG/ML
1 KIT INJECTION PRN
Status: DISCONTINUED | OUTPATIENT
Start: 2024-08-21 | End: 2024-08-25 | Stop reason: HOSPADM

## 2024-08-21 RX ADMIN — SODIUM CHLORIDE, PRESERVATIVE FREE 10 ML: 5 INJECTION INTRAVENOUS at 20:38

## 2024-08-21 RX ADMIN — METRONIDAZOLE 500 MG: 500 INJECTION, SOLUTION INTRAVENOUS at 20:57

## 2024-08-21 RX ADMIN — ENOXAPARIN SODIUM 40 MG: 100 INJECTION SUBCUTANEOUS at 20:38

## 2024-08-21 RX ADMIN — INSULIN LISPRO 1 UNITS: 100 INJECTION, SOLUTION INTRAVENOUS; SUBCUTANEOUS at 17:44

## 2024-08-21 RX ADMIN — SODIUM CHLORIDE, PRESERVATIVE FREE 10 ML: 5 INJECTION INTRAVENOUS at 10:42

## 2024-08-21 RX ADMIN — GADOTERIDOL 15 ML: 279.3 INJECTION, SOLUTION INTRAVENOUS at 12:01

## 2024-08-21 RX ADMIN — AMLODIPINE BESYLATE 5 MG: 5 TABLET ORAL at 12:44

## 2024-08-21 RX ADMIN — METRONIDAZOLE 500 MG: 500 INJECTION, SOLUTION INTRAVENOUS at 12:44

## 2024-08-21 RX ADMIN — PERFLUTREN 1.5 ML: 6.52 INJECTION, SUSPENSION INTRAVENOUS at 13:48

## 2024-08-21 RX ADMIN — CEFTRIAXONE SODIUM 1000 MG: 1 INJECTION, POWDER, FOR SOLUTION INTRAMUSCULAR; INTRAVENOUS at 20:06

## 2024-08-21 RX ADMIN — METRONIDAZOLE 500 MG: 500 INJECTION, SOLUTION INTRAVENOUS at 02:11

## 2024-08-21 RX ADMIN — MORPHINE SULFATE 3.6 MG: 4 INJECTION, SOLUTION INTRAMUSCULAR; INTRAVENOUS at 09:22

## 2024-08-21 RX ADMIN — Medication 9.9 MILLICURIE: at 07:35

## 2024-08-21 NOTE — PLAN OF CARE
Problem: Discharge Planning  Goal: Discharge to home or other facility with appropriate resources  Outcome: Progressing     Problem: Safety - Adult  Goal: Free from fall injury  Outcome: Progressing     Problem: Pain  Goal: Verbalizes/displays adequate comfort level or baseline comfort level  Outcome: Progressing     Problem: Skin/Tissue Integrity  Goal: Absence of new skin breakdown  Description: 1.  Monitor for areas of redness and/or skin breakdown  2.  Assess vascular access sites hourly  3.  Every 4-6 hours minimum:  Change oxygen saturation probe site  4.  Every 4-6 hours:  If on nasal continuous positive airway pressure, respiratory therapy assess nares and determine need for appliance change or resting period.  Outcome: Progressing     Problem: Cardiovascular - Adult  Goal: Maintains optimal cardiac output and hemodynamic stability  Outcome: Progressing  Goal: Absence of cardiac dysrhythmias or at baseline  Outcome: Progressing     Problem: Gastrointestinal - Adult  Goal: Minimal or absence of nausea and vomiting  Outcome: Progressing  Goal: Maintains or returns to baseline bowel function  Outcome: Progressing  Goal: Maintains adequate nutritional intake  Outcome: Progressing  Goal: Establish and maintain optimal ostomy function  Outcome: Progressing     Problem: Metabolic/Fluid and Electrolytes - Adult  Goal: Electrolytes maintained within normal limits  Outcome: Progressing  Goal: Hemodynamic stability and optimal renal function maintained  Outcome: Progressing  Goal: Glucose maintained within prescribed range  Outcome: Progressing     Problem: Skin/Tissue Integrity - Adult  Goal: Skin integrity remains intact  Outcome: Progressing

## 2024-08-21 NOTE — PLAN OF CARE
Problem: Discharge Planning  Goal: Discharge to home or other facility with appropriate resources  Outcome: Progressing  Flowsheets (Taken 8/21/2024 0135)  Discharge to home or other facility with appropriate resources:   Identify barriers to discharge with patient and caregiver   Arrange for needed discharge resources and transportation as appropriate   Identify discharge learning needs (meds, wound care, etc)     Problem: Safety - Adult  Goal: Free from fall injury  Outcome: Progressing  Flowsheets (Taken 8/21/2024 0135)  Free From Fall Injury: Instruct family/caregiver on patient safety     Problem: Pain  Goal: Verbalizes/displays adequate comfort level or baseline comfort level  Outcome: Progressing  Flowsheets (Taken 8/21/2024 0135)  Verbalizes/displays adequate comfort level or baseline comfort level:   Encourage patient to monitor pain and request assistance   Assess pain using appropriate pain scale   Administer analgesics based on type and severity of pain and evaluate response   Implement non-pharmacological measures as appropriate and evaluate response   Consider cultural and social influences on pain and pain management   Notify Licensed Independent Practitioner if interventions unsuccessful or patient reports new pain     Problem: Skin/Tissue Integrity  Goal: Absence of new skin breakdown  Description: 1.  Monitor for areas of redness and/or skin breakdown  2.  Assess vascular access sites hourly  3.  Every 4-6 hours minimum:  Change oxygen saturation probe site  4.  Every 4-6 hours:  If on nasal continuous positive airway pressure, respiratory therapy assess nares and determine need for appliance change or resting period.  Outcome: Progressing  Note: No new skin breakdown noted. Patient agreeable with plan of care.     Problem: Cardiovascular - Adult  Goal: Maintains optimal cardiac output and hemodynamic stability  Outcome: Progressing  Flowsheets (Taken 8/21/2024 0135)  Maintains optimal cardiac

## 2024-08-21 NOTE — CARE COORDINATION
Case Management Assessment Initial Evaluation    Date/Time of Evaluation: 8/21/2024 10:50 AM  Assessment Completed by: Martin Spicer RN    If patient is discharged prior to next notation, then this note serves as note for discharge by case management.    Patient Name: Damian Vargas                   YOB: 1960  Diagnosis: Hyponatremia [E87.1]  Jaundice [R17]  Calculus of gallbladder without cholecystitis without obstruction [K80.20]  Nausea vomiting and diarrhea [R11.2, R19.7]                   Date / Time: 8/20/2024  2:14 PM  Location: [address] [place] [address]     Patient Admission Status: Inpatient   Readmission Risk Low 0-14, Mod 15-19), High > 20: Readmission Risk Score: 10.9    Current PCP: Dr. Kennedy, new PCP (see AVS)  Health Care Decision Makers: next of kin    Additional Case Management Notes:   Hyponatremia/JOE/Gallstones/Jaundice  History: Marijuana Use  Elevated LFT/s; monitor  IV Flagyl, IV Rocephin  H 8.9; monitor  GI plans OP EGD/Colonoscopy, likely OP Cholecystectomy when stable  Await Cultures  Procedures:   8/21 MRCP  1. Multiple tiny gallstones. Gallbladder wall thickening. Pericholecystic fluid.   No intra or extrahepatic biliary dilatation. No evidence of choledocholithiasis.   2. 4.4 mm T2 hyperintense lesion in the left lobe of the liver laterally.   3. Slightly atrophic pancreas.   4. Small Bosniak type I cysts in the right and left kidneys.   5. Otherwise negative MRI scan of the abdomen and MRCP.   Patient Goals/Plan/Treatment Preferences: plans home alone, still drives, monitor new DM Clinic needs if new onset DM      8/23/24, 2:06 PM EDT    Patient goals/plan/ treatment preferences discussed by  and .  Patient goals/plan/ treatment preferences reviewed with patient/ family.  Patient/ family verbalize understanding of discharge plan and are in agreement with goal/plan/treatment preferences.  Understanding was demonstrated using the teach back method.  REAGAN  Transport Family   Condition of Participation: Discharge Planning   The Plan for Transition of Care is related to the following treatment goals: Hyponatremia Treatment   Freedom of Choice list was provided with basic dialogue that supports the patient's individualized plan of care/goals, treatment preferences, and shares the quality data associated with the providers?  No

## 2024-08-21 NOTE — PROGRESS NOTES
Pt admitted to  4K18 from ED.     Complaints: Vomiting and dehydration.      IV site free of s/s of infection or infiltration.     Vital signs obtained. Assessment and data collection initiated. Two nurse skin assessment performed by Alycia CALHOUN and Keyla CALHOUN.    Swallow Screen completed and documented for all patients ages 45 and older. Patient passed.    Policies and procedures for 4[facility] explained. All questions answered with no further questions at this time. Fall prevention and safety brochure discussed with patient.  Bed alarm on. Call light in reach. Oriented to room.

## 2024-08-21 NOTE — H&P
History & Physical  Internal Medicine Resident         Patient: Damian Vargas 64 y.o. male      : 1960  Date of Admission: 2024  Date of Service: Pt seen/examined on 24 and Admitted to Inpatient with expected LOS greater than two midnights due to medical therapy.       ASSESSMENT AND PLAN    #) Hypovolemic Hyponatremia  Sodium on admission 126  3 to 4-day history of severe nausea associated with vomiting and diarrhea.  Mucous membrane dry on exam.  Probably related to hypovolemia.  Will send in serum osmolality, urine osmolality, urine sodium, urine creatinine and urine urea.  Follow-up sodium show 4-hour.  Patient received 3 L IVF in the ED, will continue at 100 mL/h.  Follow-up labs    #) JOE  Creatinine on admission 1.8, GFR 42, unknown baseline.  Considering history of nausea, vomiting and diarrhea, probably acute kidney injury.  Urine studies to calculate FENA and Feurea.   Continue IVF    #) Elevated LFTs  On admission: , AST 81, , T. bili 4.8, CRP 22.50, , ferritin 891,  Hepatitis panel negative  GI panel pending  Cultures pending  Haptoglobin pending  Ultrasound gallbladder showed cholelithiasis and gallbladder sludge without evidence of acute cholecystitis and possible hepatic steatosis.  CT CAT scan abdomen and pelvis showed mild diffuse fatty replacement in the liver, slightly atrophic pancreas, thick walled gallbladder, small bilateral renal cyst and prostatic gland 3.8 x 3.7 cm with slightly thick walled bladder.  Follow-up INR, lipid panel, anti-smooth muscle antibody, antimitochondrial antibody.  GI consulted, appreciate their recommendations.  MRCP and HIDA scans tomorrow.    #) Leukocytosis  WBC on admission 14.0  Blood cultures pending, urine culture and UA pending  Probably reactive, will follow-up    #) Lactic Acidosis  Lactic acid of 2.5, post IVF.  Patient also has leukocytosis.  Will repeat and follow-up  Continue IVF    #) Hyperglycemia  Patient was  found to have blood glucose of 299 on CMP.  Patient denies any history of diabetes.  Will do hemoglobin A1c.  If diabetic, will probably start on SSI and discharge on oral medicines with follow-up with the PCP    #) Systolic Murmur  Patient was found to have 2/6 systolic ejection murmur, incidental finding.  Patient denies any shortness of breath or chest pain or any history of high blood pressure or congestive heart failure.  Will do EKG and echo    #) DVT prophylaxis  Lovenox    #) Diet  Adult regular    #) Disposition  Pending clinical improvement    #) Code Status  Full code      Data reviewed (unless otherwise discussed in assessment/plan)  EKG:  I have reviewed the EKG   Imaging: I have reviewed ultrasound liver and Abdominal CT Scan      =======================================================================    SUBJECTIVE    Chief Complaint: Nausea, vomiting, diarrhea and jaundice    History Of Present Illness:  Damian Vargas is a 64 y.o. male with no significant past medical history who presents to [facility] with nausea, vomiting, diarrhea and jaundice.  Patient states that he has been having nausea associated with vomiting and diarrhea for past 3 to 4 days.  He said that multiple coworkers at work but having similar complaints.  Patient denies eating anything with coworkers stated 4 days ago.  Patient went to the urgent care today to get a work excuse when he was found to have jaundiced and was sent to the ED for further evaluation.  Patient states that he has been having at least 3-4 bowel movements, watery in nature.  Patient states that he is unable to keep anything down.    Otherwise patient denies any headache, dizziness, shortness of breath, chest pain, leg swelling or urinary complaints.    ED course: Patient was given a dose of Rocephin and Flagyl and was also given 3 L boluses.     Review of Systems:   Pertinent positives and negatives as noted in the HPI. Otherwise complete ROS  negative.    OBJECTIVE  Physical Exam:  BP (!) 174/82   Pulse 83   Temp 99 °F (37.2 °C) (Oral)   Resp 16   Ht 1.778 m (5' 10\")   Wt 88.9 kg (195 lb 15.8 oz)   SpO2 99%   BMI 28.12 kg/m²   General appearance: No apparent distress, appears stated age and yellowish skin  Eyes: Sclera  icterus,  pupils equal, round, and reactive to light. Conjunctivae/corneas clear.  HENT: Head normal in appearance. External nares normal.  Oral mucosa dry without lesions.  Hearing grossly intact.   Neck: Supple, with full range of motion. Trachea midline.  No gross JVD appreciated.  Respiratory:  Normal respiratory effort. Clear to auscultation, bilaterally without rales or wheezes or rhonchi.  Cardiovascular: Normal rate, regular rhythm with normal S1/S2,  with 2/6 WILD murmur.  No lower extremity edema.   Abdomen: Soft, non-tender, non-distended with normal bowel sounds.  Musculoskeletal: No joint swelling or tenderness. Normal tone. No abnormal movements.   Skin:  warm and dry. No rashes or lesions.  Neurologic:  No focal sensory/motor deficits in the upper and lower extremities. Cranial nerves:  grossly non-focal 2-12.     Psychiatric: Alert and oriented, normal insight and thought content.   Capillary Refill: Brisk,< 3 seconds.  Peripheral Pulses: +2 palpable, equal bilaterally.       Medications Prior to Admission:   None     Allergies:  Patient has no known allergies.    Past Medical, Family, Social, Surgical Hx  History reviewed. No pertinent past medical history.  History reviewed. No pertinent surgical history.  History reviewed. No pertinent family history.  Social History     Socioeconomic History    Marital status: Single     Spouse name: None    Number of children: None    Years of education: None    Highest education level: None   Tobacco Use    Smoking status: Never    Smokeless tobacco: Never   Substance and Sexual Activity    Alcohol use: Not Currently    Drug use: Yes     Types: Marijuana (Weed)        Code

## 2024-08-21 NOTE — PROGRESS NOTES
Hospitalist Progress Note  Internal Medicine Resident      Patient: Damian Vargas 64 y.o. male      Unit/Bed: 4K-18/018-A    Admit Date: 8/20/2024      ASSESSMENT AND PLAN  Active Problems  Hypovolemic hyponatremia-improving: Sodium upon admission was 126, the patient reports a 3 to 4-day history of severe nausea associated with vomiting and diarrhea.  Admitting resident found dry mucous membranes upon initial presentation.  The patient's hyponatremia is likely due to his hypovolemic status.  Blood osmole's 288, urine osmolality 314, urine sodium less than 20  Patient was given normal saline infusion at 100 mL/h, sodium has since normalized.  Will continue to trend labs  Elevated LFTs:On admission: , AST 81, , T. bili 4.8, CRP 22.50, , ferritin 891.  Hepatitis panel was negative.  GI panel was negative, blood cultures showed no growth, haptoglobin pending. Ultrasound gallbladder showed cholelithiasis and gallbladder sludge without evidence of acute cholecystitis and possible hepatic steatosis. CT scan abdomen and pelvis showed mild diffuse fatty replacement in the liver, slightly atrophic pancreas, thick walled gallbladder, small bilateral renal cyst and prostatic gland 3.8 x 3.7 cm with slightly thick walled bladder.  HIDA scan revealed patent cystic and common bile ducts without evidence of acute cholecystitis.  MRCP was performed revealing multiple tiny gallstones, gallbladder wall thickening, no evidence of choledocholithiasis, 4.4 mm T2 hyperintense lesion of the left lobe of the liver, slight atrophy of the pancreas, small Bosniak type I cyst in the right and left kidneys  GI consulted, appreciate their recommendations  Likely reactive leukocytosis-improving: Initially 14 upon initial presentation, has decreased to 8.5 a.m. 8/21/2024.  Blood cultures, urine cultures pending.  Lactic acidosis-improving: Initial lactic acid of 2.5 upon initial presentation post IVF.  Repeat has normalized  [x]Yes / []No  IVF (still needed?): [x]Yes / []No    Level of care: []Step Down / [x]Med-Surg  Bed Status: [x]Inpatient / []Observation  Telemetry: [x]Yes / []No  PT/OT: []Yes / [x]No    DVT Prophylaxis: [x] Lovenox / [] Heparin / [] SCDs / [] Already on Systemic Anticoagulation / [] None     Expected discharge date: Unknown  Disposition: Home     Code status: Full Code     ===================================================================    Chief Complaint: Nausea, vomiting, diarrhea, jaundice  Subjective (past 24 hours): Patient was seen this morning after the bedside immediately after his HIDA scan.  He reported feeling anxious about what was going to happen to him, and was visibly upset.  He was assured that we are going to take the best care of him.  The patient reports no acute complaints at this time, review of systems is negative.    HPI / Hospital Course:   Damian Vargas is a 64 y.o. male with no significant past medical history who has not seen a doctor in many years presents to [facility] with nausea, vomiting, diarrhea and jaundice. Patient states that he has been having nausea associated with vomiting and diarrhea for past 3 to 4 days.  He said that multiple coworkers at work but having similar complaints.  Patient denies eating anything with coworkers stated 4 days ago.  Patient went to the urgent care today to get a work excuse when he was found to have jaundiced and was sent to the ED for further evaluation. Patient states that he has been having at least 3-4 bowel movements, watery in nature.  Patient states that he is unable to keep anything down.    Otherwise patient denies any headache, dizziness, shortness of breath, chest pain, leg swelling or urinary complaints.  Right upper quadrant ultrasound revealed cholelithiasis and gallbladder sludge without evidence of cholecystitis.  HIDA scan revealed patent cystic and common bile ducts without evidence of acute cholecystitis.  MRCP

## 2024-08-21 NOTE — CONSULTS
Patient seen evaluated for consult to follow patient having the symptoms at this time MRCP does not show any evidence of choledocholithiasis recommend follow-up as an outpatient colon cancer screening

## 2024-08-22 LAB
ALBUMIN SERPL BCG-MCNC: 3.2 G/DL (ref 3.5–5.1)
ALP SERPL-CCNC: 196 U/L (ref 38–126)
ALT SERPL W/O P-5'-P-CCNC: 120 U/L (ref 11–66)
ANION GAP SERPL CALC-SCNC: 9 MEQ/L (ref 8–16)
AST SERPL-CCNC: 32 U/L (ref 5–40)
BACTERIA UR CULT: NORMAL
BASOPHILS ABSOLUTE: 0 THOU/MM3 (ref 0–0.1)
BASOPHILS NFR BLD AUTO: 0.4 %
BILIRUB SERPL-MCNC: 1.2 MG/DL (ref 0.3–1.2)
BUN SERPL-MCNC: 16 MG/DL (ref 7–22)
CALCIUM SERPL-MCNC: 8.7 MG/DL (ref 8.5–10.5)
CHLORIDE SERPL-SCNC: 98 MEQ/L (ref 98–111)
CO2 SERPL-SCNC: 25 MEQ/L (ref 23–33)
CREAT SERPL-MCNC: 0.9 MG/DL (ref 0.4–1.2)
DEPRECATED RDW RBC AUTO: 34.7 FL (ref 35–45)
EOSINOPHIL NFR BLD AUTO: 4.1 %
EOSINOPHILS ABSOLUTE: 0.3 THOU/MM3 (ref 0–0.4)
ERYTHROCYTE [DISTWIDTH] IN BLOOD BY AUTOMATED COUNT: 15.5 % (ref 11.5–14.5)
GFR SERPL CREATININE-BSD FRML MDRD: > 90 ML/MIN/1.73M2
GLUCOSE BLD STRIP.AUTO-MCNC: 148 MG/DL (ref 70–108)
GLUCOSE BLD STRIP.AUTO-MCNC: 196 MG/DL (ref 70–108)
GLUCOSE BLD STRIP.AUTO-MCNC: 213 MG/DL (ref 70–108)
GLUCOSE BLD STRIP.AUTO-MCNC: 218 MG/DL (ref 70–108)
GLUCOSE SERPL-MCNC: 169 MG/DL (ref 70–108)
HCT VFR BLD AUTO: 29.4 % (ref 42–52)
HGB BLD-MCNC: 9.1 GM/DL (ref 14–18)
HGB RETIC QN AUTO: 18.9 PG (ref 28.2–35.7)
IMM GRANULOCYTES # BLD AUTO: 0.04 THOU/MM3 (ref 0–0.07)
IMM GRANULOCYTES NFR BLD AUTO: 0.5 %
IMM RETICS NFR: 16.4 % (ref 2.3–13.4)
LYMPHOCYTES ABSOLUTE: 1.9 THOU/MM3 (ref 1–4.8)
LYMPHOCYTES NFR BLD AUTO: 23.5 %
MAGNESIUM SERPL-MCNC: 2.1 MG/DL (ref 1.6–2.4)
MCH RBC QN AUTO: 19.7 PG (ref 26–33)
MCHC RBC AUTO-ENTMCNC: 31 GM/DL (ref 32.2–35.5)
MCV RBC AUTO: 63.5 FL (ref 80–94)
MICROCYTES BLD QL SMEAR: PRESENT
MITOCHONDRIAL M2 AB, IGG: < 0.5 U/ML (ref 0–4)
MONOCYTES ABSOLUTE: 0.9 THOU/MM3 (ref 0.4–1.3)
MONOCYTES NFR BLD AUTO: 10.8 %
NEUTROPHILS ABSOLUTE: 4.8 THOU/MM3 (ref 1.8–7.7)
NEUTROPHILS NFR BLD AUTO: 60.7 %
NRBC BLD AUTO-RTO: 0 /100 WBC
PHOSPHATE SERPL-MCNC: 3.2 MG/DL (ref 2.4–4.7)
PLATELET # BLD AUTO: 233 THOU/MM3 (ref 130–400)
PMV BLD AUTO: 10.2 FL (ref 9.4–12.4)
POTASSIUM SERPL-SCNC: 4.3 MEQ/L (ref 3.5–5.2)
PROT SERPL-MCNC: 6.4 G/DL (ref 6.1–8)
RBC # BLD AUTO: 4.63 MILL/MM3 (ref 4.7–6.1)
RETICS # AUTO: 46 THOU/MM3 (ref 20–115)
RETICS/RBC NFR AUTO: 1 % (ref 0.5–2)
SODIUM SERPL-SCNC: 132 MEQ/L (ref 135–145)
WBC # BLD AUTO: 7.9 THOU/MM3 (ref 4.8–10.8)

## 2024-08-22 PROCEDURE — 85046 RETICYTE/HGB CONCENTRATE: CPT

## 2024-08-22 PROCEDURE — 6360000002 HC RX W HCPCS: Performed by: STUDENT IN AN ORGANIZED HEALTH CARE EDUCATION/TRAINING PROGRAM

## 2024-08-22 PROCEDURE — 2060000000 HC ICU INTERMEDIATE R&B

## 2024-08-22 PROCEDURE — 6370000000 HC RX 637 (ALT 250 FOR IP)

## 2024-08-22 PROCEDURE — 80053 COMPREHEN METABOLIC PANEL: CPT

## 2024-08-22 PROCEDURE — 6370000000 HC RX 637 (ALT 250 FOR IP): Performed by: INTERNAL MEDICINE

## 2024-08-22 PROCEDURE — 94762 N-INVAS EAR/PLS OXIMTRY CONT: CPT

## 2024-08-22 PROCEDURE — 2580000003 HC RX 258: Performed by: STUDENT IN AN ORGANIZED HEALTH CARE EDUCATION/TRAINING PROGRAM

## 2024-08-22 PROCEDURE — 99233 SBSQ HOSP IP/OBS HIGH 50: CPT | Performed by: INTERNAL MEDICINE

## 2024-08-22 PROCEDURE — 83735 ASSAY OF MAGNESIUM: CPT

## 2024-08-22 PROCEDURE — 85025 COMPLETE CBC W/AUTO DIFF WBC: CPT

## 2024-08-22 PROCEDURE — 84100 ASSAY OF PHOSPHORUS: CPT

## 2024-08-22 PROCEDURE — 36415 COLL VENOUS BLD VENIPUNCTURE: CPT

## 2024-08-22 PROCEDURE — 82948 REAGENT STRIP/BLOOD GLUCOSE: CPT

## 2024-08-22 RX ORDER — ACETAMINOPHEN 325 MG/1
650 TABLET ORAL ONCE
Status: COMPLETED | OUTPATIENT
Start: 2024-08-22 | End: 2024-08-22

## 2024-08-22 RX ORDER — LOSARTAN POTASSIUM 50 MG/1
50 TABLET ORAL DAILY
Status: DISCONTINUED | OUTPATIENT
Start: 2024-08-22 | End: 2024-08-25 | Stop reason: HOSPADM

## 2024-08-22 RX ORDER — AMLODIPINE BESYLATE 5 MG/1
5 TABLET ORAL 2 TIMES DAILY
Status: DISCONTINUED | OUTPATIENT
Start: 2024-08-22 | End: 2024-08-25 | Stop reason: HOSPADM

## 2024-08-22 RX ORDER — HYDRALAZINE HYDROCHLORIDE 20 MG/ML
5 INJECTION INTRAMUSCULAR; INTRAVENOUS ONCE
Status: COMPLETED | OUTPATIENT
Start: 2024-08-23 | End: 2024-08-23

## 2024-08-22 RX ADMIN — SODIUM CHLORIDE, PRESERVATIVE FREE 10 ML: 5 INJECTION INTRAVENOUS at 08:50

## 2024-08-22 RX ADMIN — ACETAMINOPHEN 650 MG: 325 TABLET ORAL at 19:05

## 2024-08-22 RX ADMIN — LOSARTAN POTASSIUM 50 MG: 50 TABLET, FILM COATED ORAL at 15:09

## 2024-08-22 RX ADMIN — SODIUM CHLORIDE, PRESERVATIVE FREE 10 ML: 5 INJECTION INTRAVENOUS at 21:35

## 2024-08-22 RX ADMIN — METRONIDAZOLE 500 MG: 500 INJECTION, SOLUTION INTRAVENOUS at 13:08

## 2024-08-22 RX ADMIN — METRONIDAZOLE 500 MG: 500 INJECTION, SOLUTION INTRAVENOUS at 04:17

## 2024-08-22 RX ADMIN — METRONIDAZOLE 500 MG: 500 INJECTION, SOLUTION INTRAVENOUS at 23:23

## 2024-08-22 RX ADMIN — INSULIN LISPRO 1 UNITS: 100 INJECTION, SOLUTION INTRAVENOUS; SUBCUTANEOUS at 13:08

## 2024-08-22 RX ADMIN — AMLODIPINE BESYLATE 5 MG: 5 TABLET ORAL at 08:50

## 2024-08-22 RX ADMIN — CEFTRIAXONE SODIUM 1000 MG: 1 INJECTION, POWDER, FOR SOLUTION INTRAMUSCULAR; INTRAVENOUS at 21:33

## 2024-08-22 RX ADMIN — AMLODIPINE BESYLATE 5 MG: 5 TABLET ORAL at 21:37

## 2024-08-22 ASSESSMENT — PAIN SCALES - GENERAL
PAINLEVEL_OUTOF10: 3
PAINLEVEL_OUTOF10: 0

## 2024-08-22 ASSESSMENT — PAIN - FUNCTIONAL ASSESSMENT: PAIN_FUNCTIONAL_ASSESSMENT: ACTIVITIES ARE NOT PREVENTED

## 2024-08-22 ASSESSMENT — PAIN DESCRIPTION - ORIENTATION: ORIENTATION: MID

## 2024-08-22 ASSESSMENT — PAIN DESCRIPTION - LOCATION: LOCATION: HEAD

## 2024-08-22 ASSESSMENT — PAIN DESCRIPTION - DESCRIPTORS: DESCRIPTORS: ACHING

## 2024-08-22 NOTE — PROGRESS NOTES
Gastroenterology  Progress Note    8/22/2024 6:26 PM  Subjective:   Admit Date: 8/20/2024    Interval History: Patient presents with severe anemia with low MCV likely due to chronic GI blood loss anemia will need an EGD and colonoscopy both likely be done as an outpatient will check his iron replace as needed.  Patient being managed for diabetes patient send the need for EGD colonoscopy as an outpatient which he agreed to  Diet: ADULT ORAL NUTRITION SUPPLEMENT; Breakfast, Lunch, Dinner; Diabetic Oral Supplement  ADULT DIET; Regular; 4 carb choices (60 gm/meal)    Medications:   Scheduled Meds:    losartan  50 mg Oral Daily    amLODIPine  5 mg Oral BID    insulin lispro  0-4 Units SubCUTAneous TID WC    insulin lispro  0-4 Units SubCUTAneous Nightly    morphine  4 mg IntraVENous Once    sodium chloride flush  5-40 mL IntraVENous 2 times per day    [Held by provider] enoxaparin  40 mg SubCUTAneous Q24H    cefTRIAXone (ROCEPHIN) IV  1,000 mg IntraVENous Q24H    metroNIDAZOLE  500 mg IntraVENous Q8H     Continuous Infusions:    dextrose      sodium chloride         CBC:   Recent Labs     08/20/24  1420 08/21/24 0217 08/22/24 0447   WBC 14.0* 8.5 7.9   HGB 11.1* 8.9* 9.1*    189 233     BMP:    Recent Labs     08/20/24  1420 08/20/24  2103 08/21/24  0217 08/21/24  0641 08/21/24  1037 08/21/24  1431 08/22/24  0447   *   < > 132*   < > 135 133* 132*   K 4.1  --  4.3  --   --   --  4.3   CL 87*  --  97*  --   --   --  98   CO2 23  --  27  --   --   --  25   BUN 34*  --  26*  --   --   --  16   CREATININE 1.8*  --  1.1  --   --   --  0.9   GLUCOSE 299*  --  227*  --   --   --  169*    < > = values in this interval not displayed.     Hepatic:   Recent Labs     08/20/24  1420 08/21/24 0217 08/22/24  0447   AST 81* 46* 32   * 166* 120*   BILITOT 4.8* 2.5* 1.2   ALKPHOS 203* 157* 196*     INR:   Recent Labs     08/20/24  1621 08/21/24  0217   INR 1.31* 1.23*       Imaging:  No results found for this or  inherent in voice recognition technology.**        Electronically signed by Dr. Rowan Blas    No results found for this or any previous visit.    Results for orders placed during the hospital encounter of 08/20/24    MRI ABDOMEN W WO CONTRAST MRCP    Narrative  PROCEDURE: MRI ABDOMEN W WO CONTRAST MRCP    CLINICAL INFORMATION Brenna CBD stone?.    COMPARISON: CT scan of the abdomen and pelvis dated 8/20/2024. Gallbladder  ultrasound dated 8/20/2024. Radionuclide hepatobiliary scan obtained on the same  day..    TECHNIQUE: MRI scan was carried out through the abdomen before and after  intravenous administration of 15 cc of ProHance. MRCP was performed.    FINDINGS:  Liver: There there is a 4.4 mm T2 hyperintense lesion in the left lobe of the  liver laterally suggestive of a cyst. There are no other focal hepatic defects.  There is no abnormal enhancement noted.    Gallbladder/biliary system. There are tiny filling defects in the gallbladder  consistent with cholelithiasis. There is gallbladder wall thickening and a small  amount of pericholecystic fluid. There is no intra or extrahepatic biliary  dilatation. There is no evidence of choledocholithiasis.    Spleen: Normal.    Adrenal glands: Normal.    Pancreas: Slightly atrophic pancreas. The pancreatic duct is not dilated.    Kidneys: Small Bosniak type I cysts in the right and left kidneys.    Vascular: There is normal flow in the abdominal aorta, inferior vena cava and  portal vein.    Abdominal cavity. Small hiatal hernia. No significant ascites. No significant  lymphadenopathy..    Impression  1. Multiple tiny gallstones. Gallbladder wall thickening. Pericholecystic fluid.  No intra or extrahepatic biliary dilatation. No evidence of choledocholithiasis.  2. 4.4 mm T2 hyperintense lesion in the left lobe of the liver laterally.  3. Slightly atrophic pancreas.  4. Small Bosniak type I cysts in the right and left kidneys.  5. Otherwise negative MRI scan of the

## 2024-08-22 NOTE — PLAN OF CARE
Problem: Discharge Planning  Goal: Discharge to home or other facility with appropriate resources  8/22/2024 0234 by Lisa Hernandez RN  Outcome: Progressing  8/21/2024 1627 by Elisabeth Watkins RN  Outcome: Progressing     Problem: Safety - Adult  Goal: Free from fall injury  8/22/2024 0234 by Lisa Hernandez RN  Outcome: Progressing  8/21/2024 1627 by Elisabeth Watkins RN  Outcome: Progressing     Problem: Pain  Goal: Verbalizes/displays adequate comfort level or baseline comfort level  8/22/2024 0234 by Lisa Hernandez RN  Outcome: Progressing  8/21/2024 1627 by Elisabeth Watkins RN  Outcome: Progressing     Problem: Skin/Tissue Integrity  Goal: Absence of new skin breakdown  Description: 1.  Monitor for areas of redness and/or skin breakdown  2.  Assess vascular access sites hourly  3.  Every 4-6 hours minimum:  Change oxygen saturation probe site  4.  Every 4-6 hours:  If on nasal continuous positive airway pressure, respiratory therapy assess nares and determine need for appliance change or resting period.  8/22/2024 0234 by Lisa Hernandez RN  Outcome: Progressing  8/21/2024 1627 by Elisabeth Watkins RN  Outcome: Progressing     Problem: Cardiovascular - Adult  Goal: Maintains optimal cardiac output and hemodynamic stability  8/22/2024 0234 by Lisa Hernandez RN  Outcome: Progressing  Flowsheets (Taken 8/21/2024 1945)  Maintains optimal cardiac output and hemodynamic stability:   Monitor blood pressure and heart rate   Monitor urine output and notify Licensed Independent Practitioner for values outside of normal range   Assess for signs of decreased cardiac output   Administer fluid and/or volume expanders as ordered  8/21/2024 1627 by Elisabeth Watkins RN  Outcome: Progressing  Goal: Absence of cardiac dysrhythmias or at baseline  8/22/2024 0234 by Lisa Hernandez RN  Outcome: Progressing  Flowsheets (Taken 8/21/2024 1945)  Absence of cardiac dysrhythmias or at baseline:   Monitor cardiac rate and rhythm   Assess

## 2024-08-22 NOTE — PLAN OF CARE
Problem: Discharge Planning  Goal: Discharge to home or other facility with appropriate resources  8/22/2024 1224 by Felicia Palomino, RN  Outcome: Progressing  Flowsheets (Taken 8/22/2024 0850)  Discharge to home or other facility with appropriate resources:   Identify barriers to discharge with patient and caregiver   Arrange for needed discharge resources and transportation as appropriate   Identify discharge learning needs (meds, wound care, etc)   Refer to discharge planning if patient needs post-hospital services based on physician order or complex needs related to functional status, cognitive ability or social support system  8/22/2024 0234 by Lisa Hernandez RN  Outcome: Progressing     Problem: Safety - Adult  Goal: Free from fall injury  8/22/2024 1224 by Felicia Palomino RN  Outcome: Progressing  8/22/2024 0234 by Lisa Hernandez RN  Outcome: Progressing     Problem: Pain  Goal: Verbalizes/displays adequate comfort level or baseline comfort level  8/22/2024 1224 by Felicia Palomino RN  Outcome: Progressing  8/22/2024 0234 by Lisa Hernandez RN  Outcome: Progressing     Problem: Skin/Tissue Integrity  Goal: Absence of new skin breakdown  Description: 1.  Monitor for areas of redness and/or skin breakdown  2.  Assess vascular access sites hourly  3.  Every 4-6 hours minimum:  Change oxygen saturation probe site  4.  Every 4-6 hours:  If on nasal continuous positive airway pressure, respiratory therapy assess nares and determine need for appliance change or resting period.  8/22/2024 1224 by Felicia Palomino, RN  Outcome: Progressing  8/22/2024 0234 by Lisa Hernandez RN  Outcome: Progressing     Problem: Cardiovascular - Adult  Goal: Maintains optimal cardiac output and hemodynamic stability  8/22/2024 1224 by Felicia Palomino, RN  Outcome: Progressing  Flowsheets (Taken 8/22/2024 0850)  Maintains optimal cardiac output and hemodynamic stability: Monitor blood pressure and heart  UNIQUE Gonzalez  Outcome: Progressing  Flowsheets (Taken 8/21/2024 1945)  Hemodynamic stability and optimal renal function maintained:   Monitor labs and assess for signs and symptoms of volume excess or deficit   Monitor intake, output and patient weight  Goal: Glucose maintained within prescribed range  8/22/2024 1224 by Felicia Palomino RN  Outcome: Progressing  Flowsheets (Taken 8/22/2024 0850)  Glucose maintained within prescribed range: Monitor blood glucose as ordered  8/22/2024 0234 by Lisa Hernandez RN  Outcome: Progressing  Flowsheets (Taken 8/21/2024 1945)  Glucose maintained within prescribed range:   Monitor blood glucose as ordered   Assess for signs and symptoms of hyperglycemia and hypoglycemia   Administer ordered medications to maintain glucose within target range     Problem: Skin/Tissue Integrity - Adult  Goal: Skin integrity remains intact  8/22/2024 1224 by Felicia Palomino RN  Outcome: Progressing  Flowsheets (Taken 8/22/2024 0850)  Skin Integrity Remains Intact: Monitor for areas of redness and/or skin breakdown  8/22/2024 0234 by Lisa Hernandez RN  Outcome: Progressing  Flowsheets (Taken 8/21/2024 1945)  Skin Integrity Remains Intact:   Monitor for areas of redness and/or skin breakdown   Assess vascular access sites hourly     Problem: Chronic Conditions and Co-morbidities  Goal: Patient's chronic conditions and co-morbidity symptoms are monitored and maintained or improved  Outcome: Progressing  Flowsheets (Taken 8/22/2024 0850)  Care Plan - Patient's Chronic Conditions and Co-Morbidity Symptoms are Monitored and Maintained or Improved: Monitor and assess patient's chronic conditions and comorbid symptoms for stability, deterioration, or improvement

## 2024-08-22 NOTE — CONSULTS
[place] [id number]                              CONSULTATION      PATIENT NAME: SHELDON JOEL                 : 1960  MED REC NO: [id number]                       ROOM: [facility]  ACCOUNT NO: [id number]                       ADMIT DATE: 2024  PROVIDER: Jay Dailey MD    GI CONSULT    CONSULT DATE: 2024      REASON FOR CONSULTATION:  The patient is seen in GI consult for evaluation of abnormal liver function test as well as nausea, vomiting, likely erosive gastroenteritis.    HISTORY OF PRESENT ILLNESS:  He is a very complex 64-year-old male.  He developed nausea and vomiting.  He vomited few times.  The vomit does not contain any coffee-ground material or blood.  He recently developed diarrhea, with liquidy stool.  Both symptoms have improved now, the nausea as well as diarrhea. He said something coming on in his work. He works as a .  Few members in his job has the same problem with the gastroenteritis.  Symptom has abated at the time of evaluation.  Today, he is able to eat with no difficulty.  He was hypovolemic. He was given hydration, with that he is feeling much better at this time. He was dizzy and weak before that.    He noticed at one time skin slightly yellow.  Abnormal liver function test was confirmed.  His symptoms have improved.  He is not sure of any choledocholithiasis.  He has had leukocytosis, which has also improved. The patient was found to have also hyperglycemia, type 2 diabetes mellitus.    PAST MEDICAL HISTORY:  Significant for nothing at this time, other than diagnosis of cholelithiasis as well as type 2 diabetes.    SURGICAL HISTORY:  None.    SOCIAL HISTORY:  No smoking or alcohol abuse.  No drug abuse.    FAMILY HISTORY:  No colon cancer in the family.    ALLERGIES:  NONE.      MEDICATIONS:  He does take medications at home. He is currently on insulin,  has replacement with IV  gastrointestinal blood loss anemia.  We will consider in differential diagnosis.    PLAN:    1. The patient is going to have an EGD and colonoscopy done to evaluate due to severe iron deficiency anemia. Rule out colon cancer, acute gastrointestinal bleed, and acute gastrointestinal blood loss anemia.   2. The patient is considered for cholecystectomy at 1 one time once clinically stable.  3. New onset diabetes with high hemoglobin A1c and glucose.  4. Abnormal liver function tests as well as fatty infiltration.  1. Endoscopy to be done when clinically stable.  Need to see if the patient will accept to be done as an outpatient or as an outpatient.  2. Ferritin need to be evaluated to rule out iron deficiency anemia if needed.   3. Management of diabetes with his family physician.  Thank you for allowing me to participate in this patient's care.          ALISSA GARG MD      D:  08/22/2024 00:42:42     T:  08/22/2024 02:48:18     AT/S  Job #:  [id number]     Doc#:  [id number]

## 2024-08-22 NOTE — PROGRESS NOTES
Hospitalist Progress Note  Internal Medicine Resident      Patient: Damian Vargas 64 y.o. male      Unit/Bed: 4K-18/018-A    Admit Date: 8/20/2024      ASSESSMENT AND PLAN  Active Problems  Hypovolemic hyponatremia-improving: Sodium upon admission was 126, the patient reports a 3 to 4-day history of severe nausea associated with vomiting and diarrhea.  Admitting resident found dry mucous membranes upon initial presentation.  The patient's hyponatremia is likely due to his hypovolemic status.  Blood osmole's 288, urine osmolality 314, urine sodium less than 20  Patient was given normal saline infusion at 100 mL/h, sodium has since normalized.  Will continue to trend labs  Cholestatic hepatitis-improving:On admission: , AST 81, , T. bili 4.8, CRP 22.50, , ferritin 891.  Hepatitis panel was negative.  GI panel was negative, blood cultures showed no growth, haptoglobin pending. Ultrasound gallbladder showed cholelithiasis and gallbladder sludge without evidence of acute cholecystitis and possible hepatic steatosis. CT scan abdomen and pelvis showed mild diffuse fatty replacement in the liver, slightly atrophic pancreas, thick walled gallbladder, small bilateral renal cyst and prostatic gland 3.8 x 3.7 cm with slightly thick walled bladder.  HIDA scan revealed patent cystic and common bile ducts without evidence of acute cholecystitis.  MRCP was performed revealing multiple tiny gallstones, gallbladder wall thickening, no evidence of choledocholithiasis, 4.4 mm T2 hyperintense lesion of the left lobe of the liver, slight atrophy of the pancreas, small Bosniak type I cyst in the right and left kidneys  GI consulted, appreciate their recommendations  Likely reactive leukocytosis-improving: Initially 14 upon initial presentation, has decreased to 8.5 a.m. 8/21/2024.  Blood cultures, urine cultures pending.  Lactic acidosis-improving: Initial lactic acid of 2.5 upon initial presentation post IVF.   ultrasound revealed cholelithiasis and gallbladder sludge without evidence of cholecystitis.  HIDA scan revealed patent cystic and common bile ducts without evidence of acute cholecystitis.  MRCP was performed revealing multiple tiny gallstones, gallbladder wall thickening, no evidence of choledocholithiasis, 4.4 mm T2 hyperintense lesion of the left lobe of the liver, slight atrophy of the pancreas, small Bosniak type I cyst in the right and left kidneys.    Medications:    Infusion Medications    dextrose      sodium chloride      Scheduled Medications    losartan  50 mg Oral Daily    insulin lispro  0-4 Units SubCUTAneous TID WC    insulin lispro  0-4 Units SubCUTAneous Nightly    amLODIPine  5 mg Oral Daily    morphine  4 mg IntraVENous Once    sodium chloride flush  5-40 mL IntraVENous 2 times per day    [Held by provider] enoxaparin  40 mg SubCUTAneous Q24H    cefTRIAXone (ROCEPHIN) IV  1,000 mg IntraVENous Q24H    metroNIDAZOLE  500 mg IntraVENous Q8H    PRN Meds: glucose, dextrose bolus **OR** dextrose bolus, glucagon (rDNA), dextrose, sodium chloride flush, sodium chloride, potassium chloride **OR** potassium alternative oral replacement **OR** potassium chloride, magnesium sulfate, ondansetron **OR** ondansetron, polyethylene glycol, [Held by provider] acetaminophen **OR** [Held by provider] acetaminophen    Exam:  BP (!) 179/84   Pulse 75   Temp 98.4 °F (36.9 °C) (Oral)   Resp 16   Ht 1.778 m (5' 10\")   Wt 88.9 kg (195 lb 15.8 oz)   SpO2 98%   BMI 28.12 kg/m²   General: Visible distress, appears stated age.  Eyes:  PERRL.  Scleral icterus  HENT: Head normal appearing. Nares normal. Oral mucosa moist.  Hearing intact.   Neck: Supple, with full range of motion. Trachea midline.  No gross JVD appreciated.  Respiratory:  Normal effort. Clear to auscultation, without rales or wheezes or rhonchi.  Cardiovascular: Normal rate, 2/6 systolic ejection murmur.  No lower extremity edema  No lower extremity  edema.   Abdomen: Soft, non-tender, non-distended with normal bowel sounds.  Musculoskeletal: No joint swelling or tenderness. Normal tone. No abnormal movements.   Skin: Jaundiced.  Warm and dry. No rashes or lesions.  Neurologic:  No focal sensory/motor deficits in the upper or lower extremities. Cranial nerves:  grossly non-focal 2-12.     Psychiatric: Alert and oriented, normal insight and thought content.   Capillary Refill: Brisk,< 3 seconds.  Peripheral Pulses: +2 palpable, equal bilaterally.       Labs/Radiology: See chart or assessment above.     Electronically signed by Oscar Muro III, DO on 8/22/2024 at 2:09 PM  Case was discussed with Attending, Dr. Carrasco.

## 2024-08-23 LAB
ALBUMIN SERPL BCG-MCNC: 3.7 G/DL (ref 3.5–5.1)
ALP SERPL-CCNC: 234 U/L (ref 38–126)
ALT SERPL W/O P-5'-P-CCNC: 99 U/L (ref 11–66)
ANION GAP SERPL CALC-SCNC: 11 MEQ/L (ref 8–16)
AST SERPL-CCNC: 29 U/L (ref 5–40)
BASOPHILS ABSOLUTE: 0 THOU/MM3 (ref 0–0.1)
BASOPHILS NFR BLD AUTO: 0.2 %
BILIRUB SERPL-MCNC: 1.3 MG/DL (ref 0.3–1.2)
BUN SERPL-MCNC: 15 MG/DL (ref 7–22)
CALCIUM SERPL-MCNC: 9.3 MG/DL (ref 8.5–10.5)
CEA SERPL-MCNC: 1.4 NG/ML (ref 0–5)
CHLORIDE SERPL-SCNC: 97 MEQ/L (ref 98–111)
CO2 SERPL-SCNC: 25 MEQ/L (ref 23–33)
CREAT SERPL-MCNC: 0.9 MG/DL (ref 0.4–1.2)
DEPRECATED RDW RBC AUTO: 33.3 FL (ref 35–45)
EOSINOPHIL NFR BLD AUTO: 0.7 %
EOSINOPHILS ABSOLUTE: 0.1 THOU/MM3 (ref 0–0.4)
ERYTHROCYTE [DISTWIDTH] IN BLOOD BY AUTOMATED COUNT: 15.2 % (ref 11.5–14.5)
FERRITIN SERPL IA-MCNC: 420 NG/ML (ref 22–322)
FOLATE SERPL-MCNC: 13.6 NG/ML (ref 4.8–24.2)
GFR SERPL CREATININE-BSD FRML MDRD: > 90 ML/MIN/1.73M2
GLUCOSE BLD STRIP.AUTO-MCNC: 212 MG/DL (ref 70–108)
GLUCOSE BLD STRIP.AUTO-MCNC: 217 MG/DL (ref 70–108)
GLUCOSE BLD STRIP.AUTO-MCNC: 243 MG/DL (ref 70–108)
GLUCOSE BLD STRIP.AUTO-MCNC: 321 MG/DL (ref 70–108)
GLUCOSE SERPL-MCNC: 213 MG/DL (ref 70–108)
HAPTOGLOB SERPL-MCNC: 386 MG/DL (ref 30–200)
HCT VFR BLD AUTO: 32.4 % (ref 42–52)
HGB BLD-MCNC: 10.4 GM/DL (ref 14–18)
IMM GRANULOCYTES # BLD AUTO: 0.05 THOU/MM3 (ref 0–0.07)
IMM GRANULOCYTES NFR BLD AUTO: 0.4 %
IRON SATN MFR SERPL: 21 % (ref 20–50)
IRON SERPL-MCNC: 46 UG/DL (ref 65–195)
IRON SERPL-MCNC: 46 UG/DL (ref 65–195)
LYMPHOCYTES ABSOLUTE: 1.9 THOU/MM3 (ref 1–4.8)
LYMPHOCYTES NFR BLD AUTO: 15.5 %
MAGNESIUM SERPL-MCNC: 1.9 MG/DL (ref 1.6–2.4)
MCH RBC QN AUTO: 20.1 PG (ref 26–33)
MCHC RBC AUTO-ENTMCNC: 32.1 GM/DL (ref 32.2–35.5)
MCV RBC AUTO: 62.7 FL (ref 80–94)
MICROCYTES BLD QL SMEAR: PRESENT
MONOCYTES ABSOLUTE: 1 THOU/MM3 (ref 0.4–1.3)
MONOCYTES NFR BLD AUTO: 8.3 %
NEUTROPHILS ABSOLUTE: 9.1 THOU/MM3 (ref 1.8–7.7)
NEUTROPHILS NFR BLD AUTO: 74.9 %
NRBC BLD AUTO-RTO: 0 /100 WBC
PHOSPHATE SERPL-MCNC: 3.9 MG/DL (ref 2.4–4.7)
PLATELET # BLD AUTO: 286 THOU/MM3 (ref 130–400)
PMV BLD AUTO: 9.8 FL (ref 9.4–12.4)
POTASSIUM SERPL-SCNC: 4.7 MEQ/L (ref 3.5–5.2)
PROT SERPL-MCNC: 7.2 G/DL (ref 6.1–8)
RBC # BLD AUTO: 5.17 MILL/MM3 (ref 4.7–6.1)
SMA IGG SER-ACNC: 5 UNITS (ref 0–19)
SODIUM SERPL-SCNC: 133 MEQ/L (ref 135–145)
TIBC SERPL-MCNC: 222 UG/DL (ref 171–450)
TRANSFERRIN SERPL-MCNC: 185 MG/DL (ref 200–360)
VIT B12 SERPL-MCNC: 790 PG/ML (ref 211–911)
WBC # BLD AUTO: 12.1 THOU/MM3 (ref 4.8–10.8)

## 2024-08-23 PROCEDURE — 82746 ASSAY OF FOLIC ACID SERUM: CPT

## 2024-08-23 PROCEDURE — 6370000000 HC RX 637 (ALT 250 FOR IP): Performed by: INTERNAL MEDICINE

## 2024-08-23 PROCEDURE — 82378 CARCINOEMBRYONIC ANTIGEN: CPT

## 2024-08-23 PROCEDURE — 82948 REAGENT STRIP/BLOOD GLUCOSE: CPT

## 2024-08-23 PROCEDURE — 6370000000 HC RX 637 (ALT 250 FOR IP)

## 2024-08-23 PROCEDURE — 83735 ASSAY OF MAGNESIUM: CPT

## 2024-08-23 PROCEDURE — 84238 ASSAY NONENDOCRINE RECEPTOR: CPT

## 2024-08-23 PROCEDURE — 82728 ASSAY OF FERRITIN: CPT

## 2024-08-23 PROCEDURE — 84100 ASSAY OF PHOSPHORUS: CPT

## 2024-08-23 PROCEDURE — 82607 VITAMIN B-12: CPT

## 2024-08-23 PROCEDURE — 2580000003 HC RX 258: Performed by: STUDENT IN AN ORGANIZED HEALTH CARE EDUCATION/TRAINING PROGRAM

## 2024-08-23 PROCEDURE — 83540 ASSAY OF IRON: CPT

## 2024-08-23 PROCEDURE — 36415 COLL VENOUS BLD VENIPUNCTURE: CPT

## 2024-08-23 PROCEDURE — 6360000002 HC RX W HCPCS: Performed by: STUDENT IN AN ORGANIZED HEALTH CARE EDUCATION/TRAINING PROGRAM

## 2024-08-23 PROCEDURE — 85025 COMPLETE CBC W/AUTO DIFF WBC: CPT

## 2024-08-23 PROCEDURE — 84466 ASSAY OF TRANSFERRIN: CPT

## 2024-08-23 PROCEDURE — 99232 SBSQ HOSP IP/OBS MODERATE 35: CPT | Performed by: INTERNAL MEDICINE

## 2024-08-23 PROCEDURE — 2060000000 HC ICU INTERMEDIATE R&B

## 2024-08-23 PROCEDURE — 80053 COMPREHEN METABOLIC PANEL: CPT

## 2024-08-23 RX ORDER — CARVEDILOL 6.25 MG/1
6.25 TABLET ORAL 2 TIMES DAILY WITH MEALS
Status: DISCONTINUED | OUTPATIENT
Start: 2024-08-23 | End: 2024-08-25 | Stop reason: HOSPADM

## 2024-08-23 RX ORDER — ALOGLIPTIN 6.25 MG/1
6.25 TABLET, FILM COATED ORAL DAILY
Status: DISCONTINUED | OUTPATIENT
Start: 2024-08-23 | End: 2024-08-24

## 2024-08-23 RX ADMIN — ALOGLIPTIN 6.25 MG: 6.25 TABLET, FILM COATED ORAL at 17:26

## 2024-08-23 RX ADMIN — SODIUM CHLORIDE, PRESERVATIVE FREE 10 ML: 5 INJECTION INTRAVENOUS at 08:28

## 2024-08-23 RX ADMIN — METRONIDAZOLE 500 MG: 500 INJECTION, SOLUTION INTRAVENOUS at 12:13

## 2024-08-23 RX ADMIN — METRONIDAZOLE 500 MG: 500 INJECTION, SOLUTION INTRAVENOUS at 21:30

## 2024-08-23 RX ADMIN — CARVEDILOL 6.25 MG: 6.25 TABLET, FILM COATED ORAL at 16:56

## 2024-08-23 RX ADMIN — INSULIN LISPRO 1 UNITS: 100 INJECTION, SOLUTION INTRAVENOUS; SUBCUTANEOUS at 17:28

## 2024-08-23 RX ADMIN — METRONIDAZOLE 500 MG: 500 INJECTION, SOLUTION INTRAVENOUS at 03:49

## 2024-08-23 RX ADMIN — LOSARTAN POTASSIUM 50 MG: 50 TABLET, FILM COATED ORAL at 08:28

## 2024-08-23 RX ADMIN — INSULIN LISPRO 3 UNITS: 100 INJECTION, SOLUTION INTRAVENOUS; SUBCUTANEOUS at 13:32

## 2024-08-23 RX ADMIN — SODIUM CHLORIDE, PRESERVATIVE FREE 10 ML: 5 INJECTION INTRAVENOUS at 20:36

## 2024-08-23 RX ADMIN — HYDRALAZINE HYDROCHLORIDE 5 MG: 20 INJECTION INTRAMUSCULAR; INTRAVENOUS at 01:23

## 2024-08-23 RX ADMIN — AMLODIPINE BESYLATE 5 MG: 5 TABLET ORAL at 08:28

## 2024-08-23 RX ADMIN — AMLODIPINE BESYLATE 5 MG: 5 TABLET ORAL at 20:36

## 2024-08-23 RX ADMIN — INSULIN LISPRO 1 UNITS: 100 INJECTION, SOLUTION INTRAVENOUS; SUBCUTANEOUS at 08:30

## 2024-08-23 RX ADMIN — CEFTRIAXONE SODIUM 1000 MG: 1 INJECTION, POWDER, FOR SOLUTION INTRAMUSCULAR; INTRAVENOUS at 20:34

## 2024-08-23 NOTE — PROGRESS NOTES
Hospitalist Progress Note  Internal Medicine Resident      Patient: Damian Vargas 64 y.o. male      Unit/Bed: 4K-18/018-A    Admit Date: 8/20/2024      ASSESSMENT AND PLAN  Active Problems  Hypovolemic hyponatremia-improving: Sodium upon admission was 126, the patient reports a 3 to 4-day history of severe nausea associated with vomiting and diarrhea.  Admitting resident found dry mucous membranes upon initial presentation.  The patient's hyponatremia is likely due to his hypovolemic status.  Blood osmole's 288, urine osmolality 314, urine sodium less than 20  Patient was given normal saline infusion at 100 mL/h, sodium has since normalized.  Will continue to trend labs  Cholestatic hepatitis-improving:On admission: , AST 81, , T. bili 4.8, CRP 22.50, , ferritin 891.  Hepatitis panel was negative.  GI panel was negative, blood cultures showed no growth, haptoglobin pending. Ultrasound gallbladder showed cholelithiasis and gallbladder sludge without evidence of acute cholecystitis and possible hepatic steatosis. CT scan abdomen and pelvis showed mild diffuse fatty replacement in the liver, slightly atrophic pancreas, thick walled gallbladder, small bilateral renal cyst and prostatic gland 3.8 x 3.7 cm with slightly thick walled bladder.  HIDA scan revealed patent cystic and common bile ducts without evidence of acute cholecystitis.  MRCP was performed revealing multiple tiny gallstones, gallbladder wall thickening, no evidence of choledocholithiasis, 4.4 mm T2 hyperintense lesion of the left lobe of the liver, slight atrophy of the pancreas, small Bosniak type I cyst in the right and left kidneys  GI consulted, appreciate their recommendations  Likely reactive leukocytosis-improving: Initially 14 upon initial presentation, has decreased to 8.5 a.m. 8/21/2024.  Blood cultures, urine cultures pending.  Lactic acidosis-improving: Initial lactic acid of 2.5 upon initial presentation post IVF.   states that he is unable to keep anything down.    Otherwise patient denies any headache, dizziness, shortness of breath, chest pain, leg swelling or urinary complaints.  Right upper quadrant ultrasound revealed cholelithiasis and gallbladder sludge without evidence of cholecystitis.  HIDA scan revealed patent cystic and common bile ducts without evidence of acute cholecystitis.  MRCP was performed revealing multiple tiny gallstones, gallbladder wall thickening, no evidence of choledocholithiasis, 4.4 mm T2 hyperintense lesion of the left lobe of the liver, slight atrophy of the pancreas, small Bosniak type I cyst in the right and left kidneys.    Medications:    Infusion Medications    dextrose      sodium chloride      Scheduled Medications    carvedilol  6.25 mg Oral BID WC    alogliptin  6.25 mg Oral Daily    losartan  50 mg Oral Daily    amLODIPine  5 mg Oral BID    insulin lispro  0-4 Units SubCUTAneous TID WC    insulin lispro  0-4 Units SubCUTAneous Nightly    morphine  4 mg IntraVENous Once    sodium chloride flush  5-40 mL IntraVENous 2 times per day    [Held by provider] enoxaparin  40 mg SubCUTAneous Q24H    cefTRIAXone (ROCEPHIN) IV  1,000 mg IntraVENous Q24H    metroNIDAZOLE  500 mg IntraVENous Q8H    PRN Meds: glucose, dextrose bolus **OR** dextrose bolus, glucagon (rDNA), dextrose, sodium chloride flush, sodium chloride, potassium chloride **OR** potassium alternative oral replacement **OR** potassium chloride, magnesium sulfate, ondansetron **OR** ondansetron, polyethylene glycol, [Held by provider] acetaminophen **OR** [Held by provider] acetaminophen    Exam:  BP (!) 182/88   Pulse 79   Temp 98 °F (36.7 °C) (Oral)   Resp 16   Ht 1.778 m (5' 10\")   Wt 86.4 kg (190 lb 6.4 oz)   SpO2 100%   BMI 27.32 kg/m²   General: Visible distress, appears stated age.  Eyes:  PERRL.  Scleral icterus  HENT: Head normal appearing. Nares normal. Oral mucosa moist.  Hearing intact.   Neck: Supple, with full

## 2024-08-23 NOTE — PROGRESS NOTES
[facility]   PROGRESS NOTE      Patient: Damian Vargas  Room #: 4K-18/018-A            YOB: 1960  Age: 64 y.o.  Gender: male            Admit Date & Time: 8/20/2024  2:14 PM    Assessment:    The patient welcomed a visit and explored how he overworked himself and became ill.     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 The patient shared the stresses and strains that led to his lifestyle. We concluded in prayer.     Outcomes:  The patient was thankful for the provided prayer and care.     Plan:  1.Spiritual care will continue to follow the patient according to [facility] spiritual care SOP.       Electronically signed by Chaplain Colleen, on 8/23/2024 at 3:24 PM.  Spiritual Care Department  [place]  [phone]     08/23/24 1522   Encounter Summary   Encounter Overview/Reason Initial Encounter   Service Provided For Patient   Referral/Consult From [facility]   Support System Spouse;Children;Family members;Friends/neighbors   Last Encounter  08/23/24   Complexity of Encounter High   Begin Time 1430   End Time  1500   Total Time Calculated 30 min   Spiritual/Emotional needs   Type Spiritual Support   Assessment/Intervention/Outcome   Assessment Coping;Hopeful;Stress overload   Intervention Active listening;Empowerment;Discussed illness injury and it’s impact;Discussed meaning/purpose;Prayer (assurance of)/[place]   Outcome Acceptance;Coping;Encouraged

## 2024-08-23 NOTE — PROGRESS NOTES
Comprehensive Nutrition Assessment    Type and Reason for Visit:  Initial, Positive Nutrition Screen, Consult, Patient Education (new diabetes; poor appetite/ intake, weight loss)    Nutrition Recommendations/Plan:   Recommend referral to diabetes clinic for outpatient follow up  Discontinue ONS (Glucerna) due to improving po intake  Continue current diet: 60 grams CHO/ meal     Malnutrition Assessment:  Malnutrition Status:  At risk for malnutrition (Comment) (08/23/24 1210)    Context:  Acute Illness     Findings of the 6 clinical characteristics of malnutrition:  Energy Intake:  Mild decrease in energy intake (Comment)  Weight Loss:  1% to 2% over 1 week (per patient however unable to confirm with EMR)     Body Fat Loss:  No significant body fat loss     Muscle Mass Loss:  No significant muscle mass loss    Fluid Accumulation:  No significant fluid accumulation     Strength:  Not Performed    Nutrition Assessment:     Pt. nutritionally compromised AEB decreased po intake for 3-4 days PTA due to altered GI status.  At risk for further nutrition compromise r/t admit with Nausea/  Vomiting/ Diarrhea for 3-4 days PTA, newly diagnosed Type II DM, HTN, cholestatic hepatitis.       Nutrition Related Findings:    Pt. Report/Treatments/Miscellaneous: Patient seen with friend, reports good appetite until began with nausea/ vomiting/ diarrhea for 3-4 days prior to admit, reports feeling improved and good intake of % of meals throughout admission, reports typically has sporadic eating habits as works 12 hours shifts, some intake of sweets/ desserts and sugar-sweetened beverages, appears interested and willing to make healthier food choices to promote blood glucose control  GI Status: BM 8/22  Pertinent Labs: Sodium 133, Potassium 4.7, BUN 15, Creatinine 0.9, glucose 212, (8/21/24) A1C 7.6%  Pertinent Meds: rocephin, humalog, flagyl     Wound Type: None       Current Nutrition Intake & Therapies:    Average Meal  Intake: % (per patient)  Average Supplements Intake:  (partial intake of Glucerna)  ADULT DIET; Regular; 4 carb choices (60 gm/meal)    Anthropometric Measures:  Height: 177.8 cm (5' 10\")  Ideal Body Weight (IBW): 166 lbs (75 kg)    Admission Body Weight: 88.9 kg (195 lb 15.8 oz) (8/20; no edema)  Current Body Weight: 86.4 kg (190 lb 6.4 oz) (8/23; no edema),   IBW.    Current BMI (kg/m2): 27.3  Usual Body Weight:  (~196-198# with wt loss over ~3-4 days PTA d/t N/V/D per patient; no weight records per EMR)                       BMI Categories: Overweight (BMI 25.0-29.9)    Estimated Daily Nutrient Needs:  Energy Requirements Based On: Kcal/kg  Weight Used for Energy Requirements:  (86kgm on 8/23)  Energy (kcal/day): 8998-6234 kcals (20-25)  Weight Used for Protein Requirements: Ideal (75kgm)  Protein (g/day): ~75 grams (1)     Nutrition Diagnosis:   Altered nutrition-related lab values related to endocrine dysfuntion as evidenced by lab values (A1C (8/21/24) 7.6%)    Nutrition Interventions:   Food and/or Nutrient Delivery: Continue Current Diet  Nutrition Education/Counseling: Education completed (educated patient and friend on carbohydrate counting, heart healthy diet guidelines, information sheets provided with RD contact information)  Coordination of Nutrition Care: Continue to monitor while inpatient       Goals:     Goals: PO intake 75% or greater, by next RD assessment       Nutrition Monitoring and Evaluation:      Food/Nutrient Intake Outcomes: Food and Nutrient Intake  Physical Signs/Symptoms Outcomes: Biochemical Data, Nutrition Focused Physical Findings, Skin, Weight, GI Status    Discharge Planning:    Recommend pursue outpatient diabetes education     Awa Monreal, RD, LD  Contact: [phone]

## 2024-08-23 NOTE — PLAN OF CARE
Problem: Discharge Planning  Goal: Discharge to home or other facility with appropriate resources  Outcome: Progressing  Flowsheets (Taken 8/22/2024 2115)  Discharge to home or other facility with appropriate resources: Identify barriers to discharge with patient and caregiver     Problem: Safety - Adult  Goal: Free from fall injury  Outcome: Progressing     Problem: Pain  Goal: Verbalizes/displays adequate comfort level or baseline comfort level  Outcome: Progressing     Problem: Skin/Tissue Integrity  Goal: Absence of new skin breakdown  Description: 1.  Monitor for areas of redness and/or skin breakdown  2.  Assess vascular access sites hourly  3.  Every 4-6 hours minimum:  Change oxygen saturation probe site  4.  Every 4-6 hours:  If on nasal continuous positive airway pressure, respiratory therapy assess nares and determine need for appliance change or resting period.  Outcome: Progressing     Problem: Cardiovascular - Adult  Goal: Maintains optimal cardiac output and hemodynamic stability  Outcome: Progressing  Flowsheets (Taken 8/22/2024 2115)  Maintains optimal cardiac output and hemodynamic stability: Monitor blood pressure and heart rate     Problem: Cardiovascular - Adult  Goal: Absence of cardiac dysrhythmias or at baseline  Outcome: Progressing  Flowsheets (Taken 8/22/2024 2115)  Absence of cardiac dysrhythmias or at baseline: Monitor cardiac rate and rhythm     Problem: Gastrointestinal - Adult  Goal: Minimal or absence of nausea and vomiting  Outcome: Progressing  Flowsheets (Taken 8/22/2024 2115)  Minimal or absence of nausea and vomiting: Administer IV fluids as ordered to ensure adequate hydration     Problem: Gastrointestinal - Adult  Goal: Maintains or returns to baseline bowel function  Outcome: Progressing  Flowsheets (Taken 8/22/2024 2115)  Maintains or returns to baseline bowel function: Assess bowel function     Problem: Gastrointestinal - Adult  Goal: Maintains adequate nutritional  intake  Outcome: Progressing  Flowsheets (Taken 8/22/2024 2115)  Maintains adequate nutritional intake: Monitor percentage of each meal consumed     Problem: Gastrointestinal - Adult  Goal: Establish and maintain optimal ostomy function  Outcome: Progressing  Flowsheets (Taken 8/22/2024 2115)  Establish and maintain optimal ostomy function: Monitor output from ostomies     Problem: Metabolic/Fluid and Electrolytes - Adult  Goal: Electrolytes maintained within normal limits  Outcome: Progressing  Flowsheets (Taken 8/22/2024 2115)  Electrolytes maintained within normal limits: Monitor labs and assess patient for signs and symptoms of electrolyte imbalances     Problem: Metabolic/Fluid and Electrolytes - Adult  Goal: Hemodynamic stability and optimal renal function maintained  Outcome: Progressing  Flowsheets (Taken 8/22/2024 2115)  Hemodynamic stability and optimal renal function maintained: Monitor labs and assess for signs and symptoms of volume excess or deficit     Problem: Metabolic/Fluid and Electrolytes - Adult  Goal: Glucose maintained within prescribed range  Outcome: Progressing  Flowsheets (Taken 8/22/2024 2115)  Glucose maintained within prescribed range: Monitor blood glucose as ordered     Problem: Skin/Tissue Integrity - Adult  Goal: Skin integrity remains intact  Outcome: Progressing  Flowsheets (Taken 8/22/2024 2115)  Skin Integrity Remains Intact: Monitor for areas of redness and/or skin breakdown     Problem: Chronic Conditions and Co-morbidities  Goal: Patient's chronic conditions and co-morbidity symptoms are monitored and maintained or improved  Outcome: Progressing  Flowsheets (Taken 8/22/2024 2115)  Care Plan - Patient's Chronic Conditions and Co-Morbidity Symptoms are Monitored and Maintained or Improved: Monitor and assess patient's chronic conditions and comorbid symptoms for stability, deterioration, or improvement   Care plan reviewed with patient.  Patient verbalize understanding of the

## 2024-08-24 LAB
ALBUMIN SERPL BCG-MCNC: 3.5 G/DL (ref 3.5–5.1)
ALP SERPL-CCNC: 206 U/L (ref 38–126)
ALT SERPL W/O P-5'-P-CCNC: 95 U/L (ref 11–66)
ANION GAP SERPL CALC-SCNC: 10 MEQ/L (ref 8–16)
AST SERPL-CCNC: 51 U/L (ref 5–40)
BASOPHILS ABSOLUTE: 0.1 THOU/MM3 (ref 0–0.1)
BASOPHILS ABSOLUTE: 0.1 THOU/MM3 (ref 0–0.1)
BASOPHILS NFR BLD AUTO: 0.5 %
BASOPHILS NFR BLD AUTO: 0.6 %
BILIRUB SERPL-MCNC: 1.1 MG/DL (ref 0.3–1.2)
BUN SERPL-MCNC: 17 MG/DL (ref 7–22)
CALCIUM SERPL-MCNC: 9 MG/DL (ref 8.5–10.5)
CHLORIDE SERPL-SCNC: 96 MEQ/L (ref 98–111)
CO2 SERPL-SCNC: 26 MEQ/L (ref 23–33)
CREAT SERPL-MCNC: 1 MG/DL (ref 0.4–1.2)
CRP SERPL-MCNC: 4.61 MG/DL (ref 0–1)
CRP SERPL-MCNC: 5.1 MG/DL (ref 0–1)
DEPRECATED RDW RBC AUTO: 33.3 FL (ref 35–45)
DEPRECATED RDW RBC AUTO: 33.7 FL (ref 35–45)
EOSINOPHIL NFR BLD AUTO: 1.5 %
EOSINOPHIL NFR BLD AUTO: 1.9 %
EOSINOPHILS ABSOLUTE: 0.2 THOU/MM3 (ref 0–0.4)
EOSINOPHILS ABSOLUTE: 0.3 THOU/MM3 (ref 0–0.4)
ERYTHROCYTE [DISTWIDTH] IN BLOOD BY AUTOMATED COUNT: 15.4 % (ref 11.5–14.5)
ERYTHROCYTE [DISTWIDTH] IN BLOOD BY AUTOMATED COUNT: 15.5 % (ref 11.5–14.5)
GFR SERPL CREATININE-BSD FRML MDRD: 85 ML/MIN/1.73M2
GLUCOSE BLD STRIP.AUTO-MCNC: 194 MG/DL (ref 70–108)
GLUCOSE BLD STRIP.AUTO-MCNC: 261 MG/DL (ref 70–108)
GLUCOSE BLD STRIP.AUTO-MCNC: 284 MG/DL (ref 70–108)
GLUCOSE BLD STRIP.AUTO-MCNC: 302 MG/DL (ref 70–108)
GLUCOSE SERPL-MCNC: 193 MG/DL (ref 70–108)
HCT VFR BLD AUTO: 31.8 % (ref 42–52)
HCT VFR BLD AUTO: 32.7 % (ref 42–52)
HGB BLD-MCNC: 10 GM/DL (ref 14–18)
HGB BLD-MCNC: 10.3 GM/DL (ref 14–18)
IMM GRANULOCYTES # BLD AUTO: 0.25 THOU/MM3 (ref 0–0.07)
IMM GRANULOCYTES # BLD AUTO: 0.29 THOU/MM3 (ref 0–0.07)
IMM GRANULOCYTES NFR BLD AUTO: 1.7 %
IMM GRANULOCYTES NFR BLD AUTO: 2 %
LYMPHOCYTES ABSOLUTE: 2.8 THOU/MM3 (ref 1–4.8)
LYMPHOCYTES ABSOLUTE: 3.3 THOU/MM3 (ref 1–4.8)
LYMPHOCYTES NFR BLD AUTO: 18.8 %
LYMPHOCYTES NFR BLD AUTO: 22.8 %
MAGNESIUM SERPL-MCNC: 2 MG/DL (ref 1.6–2.4)
MCH RBC QN AUTO: 19.9 PG (ref 26–33)
MCH RBC QN AUTO: 20 PG (ref 26–33)
MCHC RBC AUTO-ENTMCNC: 31.4 GM/DL (ref 32.2–35.5)
MCHC RBC AUTO-ENTMCNC: 31.5 GM/DL (ref 32.2–35.5)
MCV RBC AUTO: 63.1 FL (ref 80–94)
MCV RBC AUTO: 63.5 FL (ref 80–94)
MICROCYTES BLD QL SMEAR: PRESENT
MICROCYTES BLD QL SMEAR: PRESENT
MONOCYTES ABSOLUTE: 1 THOU/MM3 (ref 0.4–1.3)
MONOCYTES ABSOLUTE: 1.2 THOU/MM3 (ref 0.4–1.3)
MONOCYTES NFR BLD AUTO: 6.8 %
MONOCYTES NFR BLD AUTO: 7.8 %
NEUTROPHILS ABSOLUTE: 10.5 THOU/MM3 (ref 1.8–7.7)
NEUTROPHILS ABSOLUTE: 9.5 THOU/MM3 (ref 1.8–7.7)
NEUTROPHILS NFR BLD AUTO: 65.9 %
NEUTROPHILS NFR BLD AUTO: 69.7 %
NRBC BLD AUTO-RTO: 0 /100 WBC
NRBC BLD AUTO-RTO: 0 /100 WBC
PHOSPHATE SERPL-MCNC: 3.5 MG/DL (ref 2.4–4.7)
PLATELET # BLD AUTO: 308 THOU/MM3 (ref 130–400)
PLATELET # BLD AUTO: 331 THOU/MM3 (ref 130–400)
PMV BLD AUTO: 10.5 FL (ref 9.4–12.4)
PMV BLD AUTO: 9.7 FL (ref 9.4–12.4)
POTASSIUM SERPL-SCNC: 4.8 MEQ/L (ref 3.5–5.2)
PROCALCITONIN SERPL IA-MCNC: 0.26 NG/ML (ref 0.01–0.09)
PROCALCITONIN SERPL IA-MCNC: 0.27 NG/ML (ref 0.01–0.09)
PROT SERPL-MCNC: 6.9 G/DL (ref 6.1–8)
RBC # BLD AUTO: 5.01 MILL/MM3 (ref 4.7–6.1)
RBC # BLD AUTO: 5.18 MILL/MM3 (ref 4.7–6.1)
SODIUM SERPL-SCNC: 132 MEQ/L (ref 135–145)
WBC # BLD AUTO: 14.4 THOU/MM3 (ref 4.8–10.8)
WBC # BLD AUTO: 15.1 THOU/MM3 (ref 4.8–10.8)

## 2024-08-24 PROCEDURE — 82948 REAGENT STRIP/BLOOD GLUCOSE: CPT

## 2024-08-24 PROCEDURE — 6370000000 HC RX 637 (ALT 250 FOR IP)

## 2024-08-24 PROCEDURE — 6370000000 HC RX 637 (ALT 250 FOR IP): Performed by: INTERNAL MEDICINE

## 2024-08-24 PROCEDURE — 84100 ASSAY OF PHOSPHORUS: CPT

## 2024-08-24 PROCEDURE — 36415 COLL VENOUS BLD VENIPUNCTURE: CPT

## 2024-08-24 PROCEDURE — 99233 SBSQ HOSP IP/OBS HIGH 50: CPT | Performed by: INTERNAL MEDICINE

## 2024-08-24 PROCEDURE — 80053 COMPREHEN METABOLIC PANEL: CPT

## 2024-08-24 PROCEDURE — 2580000003 HC RX 258: Performed by: STUDENT IN AN ORGANIZED HEALTH CARE EDUCATION/TRAINING PROGRAM

## 2024-08-24 PROCEDURE — 84145 PROCALCITONIN (PCT): CPT

## 2024-08-24 PROCEDURE — 86140 C-REACTIVE PROTEIN: CPT

## 2024-08-24 PROCEDURE — 6360000002 HC RX W HCPCS: Performed by: STUDENT IN AN ORGANIZED HEALTH CARE EDUCATION/TRAINING PROGRAM

## 2024-08-24 PROCEDURE — 2060000000 HC ICU INTERMEDIATE R&B

## 2024-08-24 PROCEDURE — 83735 ASSAY OF MAGNESIUM: CPT

## 2024-08-24 PROCEDURE — 85025 COMPLETE CBC W/AUTO DIFF WBC: CPT

## 2024-08-24 RX ADMIN — INSULIN LISPRO 3 UNITS: 100 INJECTION, SOLUTION INTRAVENOUS; SUBCUTANEOUS at 12:32

## 2024-08-24 RX ADMIN — METRONIDAZOLE 500 MG: 500 INJECTION, SOLUTION INTRAVENOUS at 21:00

## 2024-08-24 RX ADMIN — ALOGLIPTIN 6.25 MG: 6.25 TABLET, FILM COATED ORAL at 08:32

## 2024-08-24 RX ADMIN — AMLODIPINE BESYLATE 5 MG: 5 TABLET ORAL at 20:27

## 2024-08-24 RX ADMIN — SODIUM CHLORIDE, PRESERVATIVE FREE 10 ML: 5 INJECTION INTRAVENOUS at 20:27

## 2024-08-24 RX ADMIN — LOSARTAN POTASSIUM 50 MG: 50 TABLET, FILM COATED ORAL at 08:31

## 2024-08-24 RX ADMIN — CARVEDILOL 6.25 MG: 6.25 TABLET, FILM COATED ORAL at 08:32

## 2024-08-24 RX ADMIN — METRONIDAZOLE 500 MG: 500 INJECTION, SOLUTION INTRAVENOUS at 12:38

## 2024-08-24 RX ADMIN — CARVEDILOL 6.25 MG: 6.25 TABLET, FILM COATED ORAL at 17:54

## 2024-08-24 RX ADMIN — SODIUM CHLORIDE, PRESERVATIVE FREE 10 ML: 5 INJECTION INTRAVENOUS at 08:33

## 2024-08-24 RX ADMIN — AMLODIPINE BESYLATE 5 MG: 5 TABLET ORAL at 08:32

## 2024-08-24 RX ADMIN — METRONIDAZOLE 500 MG: 500 INJECTION, SOLUTION INTRAVENOUS at 03:39

## 2024-08-24 RX ADMIN — CEFTRIAXONE SODIUM 1000 MG: 1 INJECTION, POWDER, FOR SOLUTION INTRAMUSCULAR; INTRAVENOUS at 20:26

## 2024-08-24 RX ADMIN — INSULIN LISPRO 2 UNITS: 100 INJECTION, SOLUTION INTRAVENOUS; SUBCUTANEOUS at 17:29

## 2024-08-24 RX ADMIN — SODIUM CHLORIDE, PRESERVATIVE FREE 10 ML: 5 INJECTION INTRAVENOUS at 08:31

## 2024-08-24 ASSESSMENT — PAIN SCALES - GENERAL
PAINLEVEL_OUTOF10: 0

## 2024-08-24 NOTE — PROGRESS NOTES
Gastroenterology  Progress Note    8/24/2024 9:40 AM  Subjective:   Admit Date: 8/20/2024    Interval History: Patient presents with severe anemia with low MCV likely due to chronic GI blood loss anemia will need an EGD and colonoscopy both likely be done as an outpatient will check his iron replace as needed.  Patient being managed for diabetes patient send the need for EGD colonoscopy as an outpatient which he agreed to    8/24/2024 diabetes is well-controlled MCV is low discussed with the patient the importance of doing EGD colonoscopy as an outpatient after discharge check iron ferritin replace if needed    Diet: ADULT DIET; Regular; 4 carb choices (60 gm/meal)    Medications:   Scheduled Meds:    carvedilol  6.25 mg Oral BID WC    alogliptin  6.25 mg Oral Daily    losartan  50 mg Oral Daily    amLODIPine  5 mg Oral BID    insulin lispro  0-4 Units SubCUTAneous TID WC    insulin lispro  0-4 Units SubCUTAneous Nightly    morphine  4 mg IntraVENous Once    sodium chloride flush  5-40 mL IntraVENous 2 times per day    [Held by provider] enoxaparin  40 mg SubCUTAneous Q24H    cefTRIAXone (ROCEPHIN) IV  1,000 mg IntraVENous Q24H    metroNIDAZOLE  500 mg IntraVENous Q8H     Continuous Infusions:    dextrose      sodium chloride         CBC:   Recent Labs     08/22/24  0447 08/23/24  0547 08/24/24  0523   WBC 7.9 12.1* 15.1*   HGB 9.1* 10.4* 10.3*    286 308     BMP:    Recent Labs     08/22/24  0447 08/23/24  0547 08/24/24  0523   * 133* 132*   K 4.3 4.7 4.8   CL 98 97* 96*   CO2 25 25 26   BUN 16 15 17   CREATININE 0.9 0.9 1.0   GLUCOSE 169* 213* 193*     Hepatic:   Recent Labs     08/22/24  0447 08/23/24  0547 08/24/24  0523   AST 32 29 51*   * 99* 95*   BILITOT 1.2 1.3* 1.1   ALKPHOS 196* 234* 206*     INR:   No results for input(s): \"INR\" in the last 72 hours.      Imaging:  No results found for this or any previous visit.    Results for orders placed during the hospital encounter of  software. It may contain  minor errors which are inherent in voice recognition technology.**      Electronically signed by Dr. Rowan Blas      Endoscopy Finding:       Objective:   Vitals: BP (!) 148/74   Pulse 80   Temp 98.9 °F (37.2 °C) (Oral)   Resp 16   Ht 1.778 m (5' 10\")   Wt 86 kg (189 lb 8 oz)   SpO2 98%   BMI 27.19 kg/m²     Intake/Output Summary (Last 24 hours) at 8/24/2024 0940  Last data filed at 8/24/2024 0921  Gross per 24 hour   Intake 990 ml   Output 0 ml   Net 990 ml     General appearance: alert and cooperative with exam  Lungs: clear to auscultation bilaterally  Heart: regular rate and rhythm, S1, S2 normal, no murmur, click, rub or gallop  Abdomen: soft, non-tender; bowel sounds normal; no masses,  no organomegaly  Extremities: extremities normal, atraumatic, no cyanosis or edema    Assessment and Plan:   Patient with low MCV positive deficiency anemia we will check that plan for EGD colonoscopy as an outpatient.  Recheck with CBC, iron and ferritin rule out acute GI blood loss.  Repeat his liver.  As an outpatient which showed normal lites otherwise workup for chronic hepatitis will be started.      Follow up in GI Clinic after discharge in 2 week(s)    Patient Active Problem List:     Hyponatremia     Nausea vomiting and diarrhea     Type 2 diabetes mellitus without complication, without long-term current use of insulin (HCC)     JOE (acute kidney injury) (HCC)     Cholestatic hepatitis      Electronically signed by Jay Dailey MD on 8/24/2024 at 9:40 AM

## 2024-08-24 NOTE — PLAN OF CARE
Problem: Discharge Planning  Goal: Discharge to home or other facility with appropriate resources  Outcome: Progressing  Flowsheets (Taken 8/23/2024 2015)  Discharge to home or other facility with appropriate resources: Identify barriers to discharge with patient and caregiver     Problem: Safety - Adult  Goal: Free from fall injury  Outcome: Progressing     Problem: Pain  Goal: Verbalizes/displays adequate comfort level or baseline comfort level  Outcome: Progressing     Problem: Skin/Tissue Integrity  Goal: Absence of new skin breakdown  Description: 1.  Monitor for areas of redness and/or skin breakdown  2.  Assess vascular access sites hourly  3.  Every 4-6 hours minimum:  Change oxygen saturation probe site  4.  Every 4-6 hours:  If on nasal continuous positive airway pressure, respiratory therapy assess nares and determine need for appliance change or resting period.  Outcome: Progressing     Problem: Cardiovascular - Adult  Goal: Maintains optimal cardiac output and hemodynamic stability  Outcome: Progressing  Flowsheets (Taken 8/23/2024 2015)  Maintains optimal cardiac output and hemodynamic stability: Monitor blood pressure and heart rate     Problem: Cardiovascular - Adult  Goal: Absence of cardiac dysrhythmias or at baseline  Outcome: Progressing  Flowsheets (Taken 8/23/2024 2015)  Absence of cardiac dysrhythmias or at baseline: Monitor cardiac rate and rhythm     Problem: Gastrointestinal - Adult  Goal: Minimal or absence of nausea and vomiting  Outcome: Progressing  Flowsheets (Taken 8/23/2024 2015)  Minimal or absence of nausea and vomiting: Administer IV fluids as ordered to ensure adequate hydration     Problem: Gastrointestinal - Adult  Goal: Maintains or returns to baseline bowel function  Outcome: Progressing  Flowsheets (Taken 8/23/2024 2015)  Maintains or returns to baseline bowel function: Assess bowel function     Problem: Gastrointestinal - Adult  Goal: Maintains adequate nutritional  Progressing   Care plan reviewed with patient.  Patient verbalize understanding of the plan of care and contribute to goal setting.

## 2024-08-24 NOTE — PROGRESS NOTES
Hospitalist Progress Note  Internal Medicine Resident      Patient: Damian Vargas 64 y.o. male      Unit/Bed: 4K-18/018-A    Admit Date: 8/20/2024      ASSESSMENT AND PLAN  Active Problems  Hypovolemic hyponatremia-improving: Sodium upon admission was 126, the patient reports a 3 to 4-day history of severe nausea associated with vomiting and diarrhea.  Admitting resident found dry mucous membranes upon initial presentation.  The patient's hyponatremia is likely due to his hypovolemic status.  Blood osmole's 288, urine osmolality 314, urine sodium less than 20  Patient was given normal saline infusion at 100 mL/h, sodium has since normalized.  Will continue to trend labs  Cholestatic hepatitis-improving:On admission: , AST 81, , T. bili 4.8, CRP 22.50, , ferritin 891.  Hepatitis panel was negative.  GI panel was negative, blood cultures showed no growth, haptoglobin pending. Ultrasound gallbladder showed cholelithiasis and gallbladder sludge without evidence of acute cholecystitis and possible hepatic steatosis. CT scan abdomen and pelvis showed mild diffuse fatty replacement in the liver, slightly atrophic pancreas, thick walled gallbladder, small bilateral renal cyst and prostatic gland 3.8 x 3.7 cm with slightly thick walled bladder.  HIDA scan revealed patent cystic and common bile ducts without evidence of acute cholecystitis.  MRCP was performed revealing multiple tiny gallstones, gallbladder wall thickening, no evidence of choledocholithiasis, 4.4 mm T2 hyperintense lesion of the left lobe of the liver, slight atrophy of the pancreas, small Bosniak type I cyst in the right and left kidneys  GI consulted, appreciate their recommendations  Leukocytosis: Initially 14 upon initial presentation, has decreased to 8.5 a.m. 8/21/2024.  8/24/2024 white count 15.  Repeat Pro-Shaun, CRP, CBC.  Will reevaluate.  Expected to keep patient admitted until 8/25/2024.  Lactic acidosis-improving: Initial  to 1.1 in a.m. 8/21/2024.  Chronic Conditions (reviewed and stable unless otherwise stated)        LDA: []CVC / []PICC / []Midline / []Lee / []Drains / []Mediport / [x]None  Antibiotics: No  Steroids: No  Labs (still needed?): [x]Yes / []No  IVF (still needed?): [x]Yes / []No    Level of care: []Step Down / [x]Med-Surg  Bed Status: [x]Inpatient / []Observation  Telemetry: [x]Yes / []No  PT/OT: []Yes / [x]No    DVT Prophylaxis: [x] Lovenox / [] Heparin / [] SCDs / [] Already on Systemic Anticoagulation / [] None     Expected discharge date: Unknown  Disposition: Home     Code status: Full Code     ===================================================================    Chief Complaint: Nausea, vomiting, diarrhea, jaundice  Subjective (past 24 hours): Patient was seen this morning at the bedside, where he reports that he slept well, and that his anxiety is much better compared to yesterday.  He has no acute complaints.  ROS was negative   HPI / Hospital Course:   Damian Vargas is a 64 y.o. male with no significant past medical history who has not seen a doctor in many years presents to [facility] with nausea, vomiting, diarrhea and jaundice. Patient states that he has been having nausea associated with vomiting and diarrhea for past 3 to 4 days.  He said that multiple coworkers at work but having similar complaints.  Patient denies eating anything with coworkers stated 4 days ago.  Patient went to the urgent care today to get a work excuse when he was found to have jaundiced and was sent to the ED for further evaluation. Patient states that he has been having at least 3-4 bowel movements, watery in nature.  Patient states that he is unable to keep anything down.    Otherwise patient denies any headache, dizziness, shortness of breath, chest pain, leg swelling or urinary complaints.  Right upper quadrant ultrasound revealed cholelithiasis and gallbladder sludge without evidence of cholecystitis.  HIDA scan  sounds.  Musculoskeletal: No joint swelling or tenderness. Normal tone. No abnormal movements.   Skin: Jaundiced.  Warm and dry. No rashes or lesions.  Neurologic:  No focal sensory/motor deficits in the upper or lower extremities. Cranial nerves:  grossly non-focal 2-12.     Psychiatric: Alert and oriented, normal insight and thought content.   Capillary Refill: Brisk,< 3 seconds.  Peripheral Pulses: +2 palpable, equal bilaterally.       Labs/Radiology: See chart or assessment above.     Electronically signed by Oscar Muro III, DO on 8/24/2024 at 2:24 PM  Case was discussed with Attending, Dr. Carrasco.

## 2024-08-25 VITALS
SYSTOLIC BLOOD PRESSURE: 138 MMHG | TEMPERATURE: 98.2 F | HEART RATE: 74 BPM | HEIGHT: 70 IN | RESPIRATION RATE: 16 BRPM | WEIGHT: 189.5 LBS | DIASTOLIC BLOOD PRESSURE: 94 MMHG | BODY MASS INDEX: 27.13 KG/M2 | OXYGEN SATURATION: 100 %

## 2024-08-25 LAB
ALBUMIN SERPL BCG-MCNC: 3.2 G/DL (ref 3.5–5.1)
ALP SERPL-CCNC: 174 U/L (ref 38–126)
ALT SERPL W/O P-5'-P-CCNC: 81 U/L (ref 11–66)
ANION GAP SERPL CALC-SCNC: 10 MEQ/L (ref 8–16)
ANISOCYTOSIS BLD QL SMEAR: PRESENT
AST SERPL-CCNC: 37 U/L (ref 5–40)
BACTERIA BLD AEROBE CULT: NORMAL
BACTERIA BLD AEROBE CULT: NORMAL
BASOPHILS ABSOLUTE: 0.1 THOU/MM3 (ref 0–0.1)
BASOPHILS NFR BLD AUTO: 0.8 %
BILIRUB SERPL-MCNC: 0.8 MG/DL (ref 0.3–1.2)
BUN SERPL-MCNC: 22 MG/DL (ref 7–22)
CALCIUM SERPL-MCNC: 8.7 MG/DL (ref 8.5–10.5)
CHLORIDE SERPL-SCNC: 98 MEQ/L (ref 98–111)
CO2 SERPL-SCNC: 25 MEQ/L (ref 23–33)
CREAT SERPL-MCNC: 1.2 MG/DL (ref 0.4–1.2)
DEPRECATED RDW RBC AUTO: 33.4 FL (ref 35–45)
EOSINOPHIL NFR BLD AUTO: 2.8 %
EOSINOPHILS ABSOLUTE: 0.4 THOU/MM3 (ref 0–0.4)
ERYTHROCYTE [DISTWIDTH] IN BLOOD BY AUTOMATED COUNT: 15.2 % (ref 11.5–14.5)
GFR SERPL CREATININE-BSD FRML MDRD: 68 ML/MIN/1.73M2
GLUCOSE BLD STRIP.AUTO-MCNC: 161 MG/DL (ref 70–108)
GLUCOSE BLD STRIP.AUTO-MCNC: 238 MG/DL (ref 70–108)
GLUCOSE SERPL-MCNC: 165 MG/DL (ref 70–108)
HCT VFR BLD AUTO: 31.1 % (ref 42–52)
HGB BLD-MCNC: 9.8 GM/DL (ref 14–18)
IMM GRANULOCYTES # BLD AUTO: 0.71 THOU/MM3 (ref 0–0.07)
IMM GRANULOCYTES NFR BLD AUTO: 4.8 %
LYMPHOCYTES ABSOLUTE: 4.2 THOU/MM3 (ref 1–4.8)
LYMPHOCYTES NFR BLD AUTO: 27.9 %
MAGNESIUM SERPL-MCNC: 2.3 MG/DL (ref 1.6–2.4)
MCH RBC QN AUTO: 20 PG (ref 26–33)
MCHC RBC AUTO-ENTMCNC: 31.5 GM/DL (ref 32.2–35.5)
MCV RBC AUTO: 63.6 FL (ref 80–94)
MICROCYTES BLD QL SMEAR: PRESENT
MONOCYTES ABSOLUTE: 0.9 THOU/MM3 (ref 0.4–1.3)
MONOCYTES NFR BLD AUTO: 6.2 %
NEUTROPHILS ABSOLUTE: 8.6 THOU/MM3 (ref 1.8–7.7)
NEUTROPHILS NFR BLD AUTO: 57.5 %
NRBC BLD AUTO-RTO: 0 /100 WBC
PHOSPHATE SERPL-MCNC: 3.6 MG/DL (ref 2.4–4.7)
PLATELET # BLD AUTO: 355 THOU/MM3 (ref 130–400)
PMV BLD AUTO: 9.7 FL (ref 9.4–12.4)
POTASSIUM SERPL-SCNC: 4.8 MEQ/L (ref 3.5–5.2)
PROT SERPL-MCNC: 6.6 G/DL (ref 6.1–8)
RBC # BLD AUTO: 4.89 MILL/MM3 (ref 4.7–6.1)
SCAN OF BLOOD SMEAR: NORMAL
SODIUM SERPL-SCNC: 133 MEQ/L (ref 135–145)
STFR SERPL-MCNC: 4.5 MG/L (ref 2.2–5)
TOXIC GRANULES BLD QL SMEAR: PRESENT
WBC # BLD AUTO: 14.9 THOU/MM3 (ref 4.8–10.8)

## 2024-08-25 PROCEDURE — 80053 COMPREHEN METABOLIC PANEL: CPT

## 2024-08-25 PROCEDURE — 6360000002 HC RX W HCPCS: Performed by: STUDENT IN AN ORGANIZED HEALTH CARE EDUCATION/TRAINING PROGRAM

## 2024-08-25 PROCEDURE — 84100 ASSAY OF PHOSPHORUS: CPT

## 2024-08-25 PROCEDURE — 6370000000 HC RX 637 (ALT 250 FOR IP)

## 2024-08-25 PROCEDURE — 83735 ASSAY OF MAGNESIUM: CPT

## 2024-08-25 PROCEDURE — 6370000000 HC RX 637 (ALT 250 FOR IP): Performed by: INTERNAL MEDICINE

## 2024-08-25 PROCEDURE — 85025 COMPLETE CBC W/AUTO DIFF WBC: CPT

## 2024-08-25 PROCEDURE — 36415 COLL VENOUS BLD VENIPUNCTURE: CPT

## 2024-08-25 PROCEDURE — 99239 HOSP IP/OBS DSCHRG MGMT >30: CPT | Performed by: INTERNAL MEDICINE

## 2024-08-25 PROCEDURE — 82948 REAGENT STRIP/BLOOD GLUCOSE: CPT

## 2024-08-25 PROCEDURE — 2580000003 HC RX 258: Performed by: STUDENT IN AN ORGANIZED HEALTH CARE EDUCATION/TRAINING PROGRAM

## 2024-08-25 RX ORDER — SELENIUM 50 MCG
1 TABLET ORAL DAILY
Qty: 15 CAPSULE | Refills: 0 | Status: SHIPPED | OUTPATIENT
Start: 2024-08-25

## 2024-08-25 RX ORDER — BLOOD-GLUCOSE METER
1 KIT MISCELLANEOUS DAILY
Qty: 1 KIT | Refills: 0 | Status: SHIPPED | OUTPATIENT
Start: 2024-08-25

## 2024-08-25 RX ORDER — GLUCOSAMINE HCL/CHONDROITIN SU 500-400 MG
CAPSULE ORAL
Qty: 60 STRIP | Refills: 0 | Status: SHIPPED | OUTPATIENT
Start: 2024-08-25

## 2024-08-25 RX ORDER — LANCETS 30 GAUGE
1 EACH MISCELLANEOUS 2 TIMES DAILY
Qty: 300 EACH | Refills: 1 | Status: SHIPPED | OUTPATIENT
Start: 2024-08-25

## 2024-08-25 RX ORDER — LOSARTAN POTASSIUM 50 MG/1
50 TABLET ORAL DAILY
Qty: 30 TABLET | Refills: 3 | Status: SHIPPED | OUTPATIENT
Start: 2024-08-26

## 2024-08-25 RX ORDER — AMLODIPINE BESYLATE 5 MG/1
5 TABLET ORAL 2 TIMES DAILY
Qty: 30 TABLET | Refills: 3 | Status: SHIPPED | OUTPATIENT
Start: 2024-08-25

## 2024-08-25 RX ORDER — CARVEDILOL 6.25 MG/1
6.25 TABLET ORAL 2 TIMES DAILY WITH MEALS
Qty: 60 TABLET | Refills: 3 | Status: SHIPPED | OUTPATIENT
Start: 2024-08-25

## 2024-08-25 RX ADMIN — CARVEDILOL 6.25 MG: 6.25 TABLET, FILM COATED ORAL at 08:13

## 2024-08-25 RX ADMIN — METRONIDAZOLE 500 MG: 500 INJECTION, SOLUTION INTRAVENOUS at 03:45

## 2024-08-25 RX ADMIN — INSULIN LISPRO 2 UNITS: 100 INJECTION, SOLUTION INTRAVENOUS; SUBCUTANEOUS at 11:43

## 2024-08-25 RX ADMIN — LOSARTAN POTASSIUM 50 MG: 50 TABLET, FILM COATED ORAL at 08:13

## 2024-08-25 RX ADMIN — METRONIDAZOLE 500 MG: 500 INJECTION, SOLUTION INTRAVENOUS at 11:47

## 2024-08-25 RX ADMIN — AMLODIPINE BESYLATE 5 MG: 5 TABLET ORAL at 08:13

## 2024-08-25 RX ADMIN — SODIUM CHLORIDE, PRESERVATIVE FREE 10 ML: 5 INJECTION INTRAVENOUS at 07:57

## 2024-08-25 ASSESSMENT — PAIN SCALES - GENERAL: PAINLEVEL_OUTOF10: 0

## 2024-08-25 NOTE — DISCHARGE INSTRUCTIONS
You have been given two options for a Primary Care provider.    The [facility] address is the resident clinic where Dr. Muro works. Try this as a first option    I have also added Dr. Chan for General Surgery follow up of possible elective gallbladder surgery.     Dr. Dailey (GI) can be followed for the liver and colonoscopy as outpatient.

## 2024-08-25 NOTE — DISCHARGE INSTR - DIET

## 2024-08-25 NOTE — PLAN OF CARE
Problem: Discharge Planning  Goal: Discharge to home or other facility with appropriate resources  8/25/2024 1243 by Marjorie Loyola, RN  Outcome: Adequate for Discharge  8/24/2024 2300 by Ofelia Zaidi, RN  Outcome: Progressing  Flowsheets (Taken 8/24/2024 2015)  Discharge to home or other facility with appropriate resources: Identify barriers to discharge with patient and caregiver

## 2024-08-25 NOTE — DISCHARGE SUMMARY
Resident Discharge Summary (Hospitalist)      Patient: Damian Vargas 64 y.o. male  : 1960  MRN: [id number]   Account: [id number]   Patient's PCP: No primary care provider on file.    Admit Date: 2024   Discharge Date: 2024      Admitting Physician: No admitting provider for patient encounter.  Discharge Physician: Oscar Muro III, DO       Discharge Diagnoses:  Hypovolemic hyponatremia-improving: Sodium upon admission was 126, the patient reports a 3 to 4-day history of severe nausea associated with vomiting and diarrhea.  Admitting resident found dry mucous membranes upon initial presentation.  The patient's hyponatremia was likely due to his hypovolemic status.  Upon admission, blood osmole's 288, urine osmolality 314, urine sodium less than 20. Patient was given normal saline infusion at 100 mL/h, sodium has normalized.   Cholestatic hepatitis-improving:On admission: , AST 81, , T. bili 4.8, CRP 22.50, , ferritin 891.  Hepatitis panel was negative.  GI panel was negative, blood cultures showed no growth, haptoglobin pending. Ultrasound gallbladder showed cholelithiasis and gallbladder sludge without evidence of acute cholecystitis and possible hepatic steatosis. CT scan abdomen and pelvis showed mild diffuse fatty replacement in the liver, slightly atrophic pancreas, thick walled gallbladder, small bilateral renal cyst and prostatic gland 3.8 x 3.7 cm with slightly thick walled bladder.  HIDA scan revealed patent cystic and common bile ducts without evidence of acute cholecystitis.  MRCP was performed revealing multiple tiny gallstones, gallbladder wall thickening, no evidence of choledocholithiasis, 4.4 mm T2 hyperintense lesion of the left lobe of the liver, slight atrophy of the pancreas, small Bosniak type I cyst in the right and left kidneys.  GI consulted recommended outpatient colonoscopy and EGD, along with outpatient evaluation by surgery for evaluation of  medical history who has not seen a doctor in many years presents to [facility] with nausea, vomiting, diarrhea and jaundice. Patient states that he has been having nausea associated with vomiting and diarrhea for past 3 to 4 days.  He said that multiple coworkers at work but having similar complaints.  Patient denies eating anything with coworkers stated 4 days ago.  Patient went to the urgent care today to get a work excuse when he was found to have jaundiced and was sent to the ED for further evaluation. Patient states that he has been having at least 3-4 bowel movements, watery in nature.  Patient states that he is unable to keep anything down.  Otherwise patient denies any headache, dizziness, shortness of breath, chest pain, leg swelling or urinary complaints.  Right upper quadrant ultrasound revealed cholelithiasis and gallbladder sludge without evidence of cholecystitis.  HIDA scan revealed patent cystic and common bile ducts without evidence of acute cholecystitis.  MRCP was performed revealing multiple tiny gallstones, gallbladder wall thickening, no evidence of choledocholithiasis, 4.4 mm T2 hyperintense lesion of the left lobe of the liver, slight atrophy of the pancreas, small Bosniak type I cyst in the right and left kidneys.  Patient had difficulty controlling blood pressure during his admission, blood pressure was eventually controlled and the patient was started on carvedilol, amlodipine, losartan for outpatient blood pressure control.  Patient had transient leukocytosis 1 day before discharge, was held overnight, additional liver panel ordered, revealing no cause.  Patient was given Augmentin upon discharge.    The patient was seen and examined on day of discharge and this discharge summary is in conjunction with any daily progress note from day of discharge.    The patient is discharged in stable condition.       Exam:   Vitals:  Vitals:    08/24/24 2330 08/25/24 0330 08/25/24 0755

## 2024-08-25 NOTE — PLAN OF CARE
Problem: Discharge Planning  Goal: Discharge to home or other facility with appropriate resources  Outcome: Progressing  Flowsheets (Taken 8/24/2024 2015)  Discharge to home or other facility with appropriate resources: Identify barriers to discharge with patient and caregiver     Problem: Safety - Adult  Goal: Free from fall injury  Outcome: Progressing     Problem: Pain  Goal: Verbalizes/displays adequate comfort level or baseline comfort level  Outcome: Progressing     Problem: Skin/Tissue Integrity  Goal: Absence of new skin breakdown  Description: 1.  Monitor for areas of redness and/or skin breakdown  2.  Assess vascular access sites hourly  3.  Every 4-6 hours minimum:  Change oxygen saturation probe site  4.  Every 4-6 hours:  If on nasal continuous positive airway pressure, respiratory therapy assess nares and determine need for appliance change or resting period.  Outcome: Progressing     Problem: Cardiovascular - Adult  Goal: Maintains optimal cardiac output and hemodynamic stability  Outcome: Progressing  Flowsheets (Taken 8/24/2024 2015)  Maintains optimal cardiac output and hemodynamic stability: Monitor blood pressure and heart rate     Problem: Cardiovascular - Adult  Goal: Absence of cardiac dysrhythmias or at baseline  Outcome: Progressing  Flowsheets (Taken 8/24/2024 2015)  Absence of cardiac dysrhythmias or at baseline: Monitor cardiac rate and rhythm     Problem: Gastrointestinal - Adult  Goal: Minimal or absence of nausea and vomiting  Outcome: Progressing  Flowsheets (Taken 8/24/2024 2015)  Minimal or absence of nausea and vomiting: Administer IV fluids as ordered to ensure adequate hydration     Problem: Gastrointestinal - Adult  Goal: Maintains or returns to baseline bowel function  Outcome: Progressing  Flowsheets (Taken 8/24/2024 2015)  Maintains or returns to baseline bowel function: Assess bowel function     Problem: Gastrointestinal - Adult  Goal: Maintains adequate nutritional  goal setting.

## 2024-09-04 ENCOUNTER — TELEPHONE (OUTPATIENT)
Dept: SURGERY | Age: 64
End: 2024-09-04

## 2024-09-04 NOTE — TELEPHONE ENCOUNTER
Left voicemail; appt on 9/25 needs reschedule to the morning or another day, Dr. Chan is out of office after 12

## 2024-09-12 NOTE — PROGRESS NOTES
Physician Progress Note      PATIENT:               SHELDON JOEL  [facility] #:                  [id number]  :                       1960  ADMIT DATE:       2024 2:14 PM  DISCH DATE:        2024 1:47 PM  RESPONDING  PROVIDER #:        Champ Carrasco DO          QUERY TEXT:    Pt admitted with nausea, vomiting.  If possible, please document in progress   notes and discharge summary to further specify the cause of the nausea and   vomiting:    The medical record reflects the following:  Risk Factors: 64 yr, DM, HLD, Elevated LFTs  Clinical Indicators:  On admission , AST 81, , T. bili 4.8, CRP   22.50, , ferritin 89, WBC 14.0, Ultrasound gallbladder showed   cholelithiasis, MRCP was performed revealing multiple tiny gallstones,   gallbladder wall thickening, no evidence of choledocholithiasis, per PN note   on  \"Most consistent with viral syndrome, with enteritis, transaminitis,   N/V, and malaise.  Patient doing much better, tolerating diet, and getting   energy back.\"  Treatment: continuous IVF, lab monitoring, Gastroenterology consult, MRCP. ABD   US, f/u on GI and referral to general surgery        [email or web address]  Options provided:  -- Viral gastroenteritis  -- Cholestatic hepatitis  -- Hyponatremia  -- nausea and vomiting due to, Please specify cause  -- Other - I will add my own diagnosis  -- Disagree - Not applicable / Not valid  -- Disagree - Clinically unable to determine / Unknown  -- Refer to Clinical Documentation Reviewer    PROVIDER RESPONSE TEXT:    This patient has nausea and vomiting due to cholestatic hepatitis.    Query created by: ROXANA GOLDSTEIN on 2024 2:43 PM      Electronically signed by:  Champ Carrasco DO 2024 8:42 PM

## 2024-09-16 SDOH — ECONOMIC STABILITY: FOOD INSECURITY: WITHIN THE PAST 12 MONTHS, YOU WORRIED THAT YOUR FOOD WOULD RUN OUT BEFORE YOU GOT MONEY TO BUY MORE.: SOMETIMES TRUE

## 2024-09-16 SDOH — ECONOMIC STABILITY: FOOD INSECURITY: WITHIN THE PAST 12 MONTHS, THE FOOD YOU BOUGHT JUST DIDN'T LAST AND YOU DIDN'T HAVE MONEY TO GET MORE.: SOMETIMES TRUE

## 2024-09-16 SDOH — ECONOMIC STABILITY: TRANSPORTATION INSECURITY
IN THE PAST 12 MONTHS, HAS LACK OF TRANSPORTATION KEPT YOU FROM MEETINGS, WORK, OR FROM GETTING THINGS NEEDED FOR DAILY LIVING?: NO

## 2024-09-16 SDOH — ECONOMIC STABILITY: INCOME INSECURITY: HOW HARD IS IT FOR YOU TO PAY FOR THE VERY BASICS LIKE FOOD, HOUSING, MEDICAL CARE, AND HEATING?: SOMEWHAT HARD

## 2024-09-17 ENCOUNTER — NURSE ONLY (OUTPATIENT)
Dept: INTERNAL MEDICINE CLINIC | Age: 64
End: 2024-09-17

## 2024-09-17 DIAGNOSIS — E11.9 TYPE 2 DIABETES MELLITUS WITHOUT COMPLICATION, WITHOUT LONG-TERM CURRENT USE OF INSULIN (HCC): Primary | ICD-10-CM

## 2024-09-17 PROCEDURE — NBSRV NON-BILLABLE SERVICE: Performed by: DIETITIAN, REGISTERED

## 2024-09-23 NOTE — TELEPHONE ENCOUNTER
Pt is out of his medications. He was scheduled the day our computers went down and only wants to see you so isn't scheduled until November. He is requesting a refill of his Metformin since he hasn't had his hospital follow up yet. Please send enough until next appointment.

## 2024-09-23 NOTE — TELEPHONE ENCOUNTER
Spoke with Dr Muro, he gave approval to go ahead and call in refill of Metformin to [facility]. Left refill on [facility] voicemail.

## 2024-09-25 ENCOUNTER — OFFICE VISIT (OUTPATIENT)
Dept: SURGERY | Age: 64
End: 2024-09-25
Payer: COMMERCIAL

## 2024-09-25 VITALS
TEMPERATURE: 97.3 F | BODY MASS INDEX: 27.72 KG/M2 | HEIGHT: 70 IN | DIASTOLIC BLOOD PRESSURE: 72 MMHG | WEIGHT: 193.6 LBS | SYSTOLIC BLOOD PRESSURE: 130 MMHG | OXYGEN SATURATION: 98 % | HEART RATE: 82 BPM | RESPIRATION RATE: 18 BRPM

## 2024-09-25 DIAGNOSIS — K80.20 CALCULUS OF GALLBLADDER WITHOUT CHOLECYSTITIS WITHOUT OBSTRUCTION: Primary | ICD-10-CM

## 2024-09-25 PROCEDURE — 99204 OFFICE O/P NEW MOD 45 MIN: CPT | Performed by: SURGERY

## 2024-09-25 PROCEDURE — 3017F COLORECTAL CA SCREEN DOC REV: CPT | Performed by: SURGERY

## 2024-09-25 PROCEDURE — G8417 CALC BMI ABV UP PARAM F/U: HCPCS | Performed by: SURGERY

## 2024-09-25 PROCEDURE — 1036F TOBACCO NON-USER: CPT | Performed by: SURGERY

## 2024-09-25 PROCEDURE — G8427 DOCREV CUR MEDS BY ELIG CLIN: HCPCS | Performed by: SURGERY

## 2024-09-28 ASSESSMENT — ENCOUNTER SYMPTOMS
CHOKING: 0
COLOR CHANGE: 0
FACIAL SWELLING: 0
ALLERGIC/IMMUNOLOGIC NEGATIVE: 1
SHORTNESS OF BREATH: 0
EYE DISCHARGE: 0
SINUS PRESSURE: 0
NAUSEA: 0
TROUBLE SWALLOWING: 0
ABDOMINAL PAIN: 0
CHEST TIGHTNESS: 0
SORE THROAT: 0
CONSTIPATION: 0
RECTAL PAIN: 0
COUGH: 0
RHINORRHEA: 0
EYE REDNESS: 0
VOICE CHANGE: 0
BACK PAIN: 0
ANAL BLEEDING: 0
VOMITING: 0
BLOOD IN STOOL: 0
DIARRHEA: 0
PHOTOPHOBIA: 0
EYE PAIN: 0
ABDOMINAL DISTENTION: 0
EYE ITCHING: 0
APNEA: 0
STRIDOR: 0
WHEEZING: 0

## 2024-09-30 ENCOUNTER — HOSPITAL ENCOUNTER (OUTPATIENT)
Age: 64
Discharge: HOME | End: 2024-09-30
Payer: COMMERCIAL

## 2024-09-30 DIAGNOSIS — D50.0 IRON DEFICIENCY ANEMIA DUE TO CHRONIC BLOOD LOSS: ICD-10-CM

## 2024-09-30 DIAGNOSIS — R71.8 LOW MEAN CORPUSCULAR VOLUME (MCV): ICD-10-CM

## 2024-09-30 DIAGNOSIS — R63.4 WEIGHT LOSS: ICD-10-CM

## 2024-09-30 LAB
ALBUMIN SERPL BCG-MCNC: 4.5 G/DL (ref 3.5–5.1)
ALP SERPL-CCNC: 71 U/L (ref 38–126)
ALT SERPL W/O P-5'-P-CCNC: 18 U/L (ref 11–66)
ANION GAP SERPL CALC-SCNC: 11 MEQ/L (ref 8–16)
AST SERPL-CCNC: 18 U/L (ref 5–40)
BILIRUB SERPL-MCNC: 0.6 MG/DL (ref 0.3–1.2)
BUN SERPL-MCNC: 29 MG/DL (ref 7–22)
CALCIUM SERPL-MCNC: 9.3 MG/DL (ref 8.5–10.5)
CHLORIDE SERPL-SCNC: 100 MEQ/L (ref 98–111)
CO2 SERPL-SCNC: 26 MEQ/L (ref 23–33)
CREAT SERPL-MCNC: 0.9 MG/DL (ref 0.4–1.2)
DEPRECATED RDW RBC AUTO: 37.9 FL (ref 35–45)
ERYTHROCYTE [DISTWIDTH] IN BLOOD BY AUTOMATED COUNT: 16.7 % (ref 11.5–14.5)
FERRITIN SERPL IA-MCNC: 164 NG/ML (ref 22–322)
GFR SERPL CREATININE-BSD FRML MDRD: > 90 ML/MIN/1.73M2
GLUCOSE SERPL-MCNC: 114 MG/DL (ref 70–108)
HCT VFR BLD AUTO: 35.8 % (ref 42–52)
HGB BLD-MCNC: 11 GM/DL (ref 14–18)
IRON SERPL-MCNC: 79 UG/DL (ref 65–195)
MCH RBC QN AUTO: 20.3 PG (ref 26–33)
MCHC RBC AUTO-ENTMCNC: 30.7 GM/DL (ref 32.2–35.5)
MCV RBC AUTO: 66.1 FL (ref 80–94)
PLATELET # BLD AUTO: 255 THOU/MM3 (ref 130–400)
PMV BLD AUTO: 10.3 FL (ref 9.4–12.4)
POTASSIUM SERPL-SCNC: 4.3 MEQ/L (ref 3.5–5.2)
PROT SERPL-MCNC: 7.7 G/DL (ref 6.1–8)
RBC # BLD AUTO: 5.42 MILL/MM3 (ref 4.7–6.1)
SODIUM SERPL-SCNC: 137 MEQ/L (ref 135–145)
WBC # BLD AUTO: 7.9 THOU/MM3 (ref 4.8–10.8)

## 2024-09-30 PROCEDURE — 80053 COMPREHEN METABOLIC PANEL: CPT

## 2024-09-30 PROCEDURE — 83540 ASSAY OF IRON: CPT

## 2024-09-30 PROCEDURE — 85027 COMPLETE CBC AUTOMATED: CPT

## 2024-09-30 PROCEDURE — 36415 COLL VENOUS BLD VENIPUNCTURE: CPT

## 2024-09-30 PROCEDURE — 82728 ASSAY OF FERRITIN: CPT

## 2024-10-23 ENCOUNTER — OFFICE VISIT (OUTPATIENT)
Dept: INTERNAL MEDICINE CLINIC | Age: 64
End: 2024-10-23
Payer: COMMERCIAL

## 2024-10-23 VITALS
TEMPERATURE: 98.1 F | HEIGHT: 70 IN | WEIGHT: 188.4 LBS | HEART RATE: 86 BPM | DIASTOLIC BLOOD PRESSURE: 78 MMHG | BODY MASS INDEX: 26.97 KG/M2 | SYSTOLIC BLOOD PRESSURE: 142 MMHG

## 2024-10-23 DIAGNOSIS — E11.9 NEWLY DIAGNOSED DIABETES (HCC): Primary | ICD-10-CM

## 2024-10-23 PROCEDURE — NBSRV NON-BILLABLE SERVICE: Performed by: REGISTERED NURSE

## 2024-10-23 PROCEDURE — G0108 DIAB MANAGE TRN  PER INDIV: HCPCS | Performed by: REGISTERED NURSE

## 2024-10-23 ASSESSMENT — PATIENT HEALTH QUESTIONNAIRE - PHQ9
SUM OF ALL RESPONSES TO PHQ QUESTIONS 1-9: 0
SUM OF ALL RESPONSES TO PHQ9 QUESTIONS 1 & 2: 0
SUM OF ALL RESPONSES TO PHQ QUESTIONS 1-9: 0
2. FEELING DOWN, DEPRESSED OR HOPELESS: NOT AT ALL
SUM OF ALL RESPONSES TO PHQ QUESTIONS 1-9: 0
1. LITTLE INTEREST OR PLEASURE IN DOING THINGS: NOT AT ALL
SUM OF ALL RESPONSES TO PHQ QUESTIONS 1-9: 0

## 2024-10-23 NOTE — PATIENT INSTRUCTIONS
Increase your Metformin to 500mg to 2 times per day as directed  Continue with meal planning --consider up to 30 grams of carbohydrate            At each meal plus always have protein with meals and snacks  Stay active  Check blood sugars 2 times per day             Waking up , before meals or 2 hours after a meal            Goal  before meals                Under 150/160 within 2 hrs after eating           Ideal:  fasting and 2 hrs after a meal  Check with coworker about the continuous they use/ coverage         -what do use? Do they take insulin? Do they get it at pharmacy or supply co?

## 2024-10-23 NOTE — PROGRESS NOTES
[facility]  [address] [place] [address]  [phone] (phone)  [phone] (fax)    Patient ID: Damian Vargas 1960  Referring Provider: Dr. Clemente     Diabetes Mellitus Type 2, Initial Visit: Patient here for an initial evaluation of Type 2 diabetes mellitus. Last c-peptide was none.  Current symptoms/problems include none.     The patient was initially diagnosed with Type 2 diabetes mellitus since 8/2024.    Known diabetic complications: peripheral neuropathy  Cardiovascular risk factors: advanced age (older than 55 for men, 65 for women), diabetes mellitus, hypertension, and male gender  Family history of diabetes: pat grandfather  Weight trend: down 10lbs sicne sick in August  Smoking/tobacco use: yest over 20 years ago  Alcohol use: no; stopped over 20 yrs ago    Current Diabetes Pharmacotherapy:  Metformin 500mg daily    Glucose Trends:   Glucose at 4.5 hrs PPD today resulted at 146mg/dl  Current monitoring regimen: Fingerstick blood tests - 2 times daily  Home blood sugar trends:    -Fasting AM:  140-150's   -Before lunch:    -Before dinner:                              2hrpp 120-140's   -Bedtime:  Any episodes of hypoglycemia? no   -Treats with na    Lifestyle Factors:   Previous visit with dietician: yes - in the hospital; group session  Current diet: B: 4:30a-10am eats at work [employer] bowl                                         2 eggs/ 2 turkey negron/ Keto toast/ almond butter            L: 12p-2-3pm salmon/ too good yogurt/ tomatoes/ cucumber/ occas fruit                       D: 6p-9pm salmon/ green beans/ yogurt                       Snacks: atkins bars; grapes; 2 cheese sticks                       Beverages: coffee; renu water; water with flavor; celcius  Current exercise: works [employer]--walks all day long      Health Maintenance:  Eye exam current (within one year): no Date: years ago; needs appt. Plans SVS  Any history of foot problems? no  Foot exam current: no

## 2024-10-28 RX ORDER — AMLODIPINE BESYLATE 5 MG/1
5 TABLET ORAL 2 TIMES DAILY
Qty: 30 TABLET | Refills: 3 | Status: SHIPPED | OUTPATIENT
Start: 2024-10-28

## 2024-11-05 ENCOUNTER — NURSE ONLY (OUTPATIENT)
Dept: INTERNAL MEDICINE CLINIC | Age: 64
End: 2024-11-05

## 2024-11-05 NOTE — PROGRESS NOTES
Patient presented for the [facility]  education class. The content was presented via PowerPoint, lecture and case-based format covering the following concepts: 60mins education.    What is Diabetes?  Define glycosolation  Interpretation of the A1C and goal.  Pancreatic function  Target organs and macro and microvascular complications  Goals for SGM BP goals  Meal clearance   S&S hyper/hypoglycemia and tx   Insulin physiology-basal/bolus  Navigating sick days stress  Relationship between diet, exercise, meds and stress   Utilizing getachew care system at appropriate time  Assuming responsibility in self management  Troubleshooting patterns       Pt was also given a Carb count guide booklet, sample menu, plate method, healthy snacks x2, food logging.

## 2024-11-06 ENCOUNTER — OFFICE VISIT (OUTPATIENT)
Dept: INTERNAL MEDICINE CLINIC | Age: 64
End: 2024-11-06

## 2024-11-06 VITALS
DIASTOLIC BLOOD PRESSURE: 70 MMHG | SYSTOLIC BLOOD PRESSURE: 134 MMHG | HEART RATE: 69 BPM | BODY MASS INDEX: 27.17 KG/M2 | HEIGHT: 70 IN | OXYGEN SATURATION: 98 % | WEIGHT: 189.8 LBS

## 2024-11-06 DIAGNOSIS — E10.9 TYPE 1 DIABETES MELLITUS WITHOUT COMPLICATION (HCC): Primary | ICD-10-CM

## 2024-11-06 DIAGNOSIS — I10 PRIMARY HYPERTENSION: ICD-10-CM

## 2024-11-06 RX ORDER — CARVEDILOL 6.25 MG/1
6.25 TABLET ORAL 2 TIMES DAILY WITH MEALS
Qty: 180 TABLET | Refills: 1 | Status: SHIPPED | OUTPATIENT
Start: 2024-11-06 | End: 2024-11-06

## 2024-11-06 RX ORDER — LOSARTAN POTASSIUM 50 MG/1
100 TABLET ORAL DAILY
Qty: 90 TABLET | Refills: 1 | Status: SHIPPED | OUTPATIENT
Start: 2024-11-06

## 2024-11-06 RX ORDER — CARVEDILOL 6.25 MG/1
6.25 TABLET ORAL 2 TIMES DAILY WITH MEALS
Qty: 180 TABLET | Refills: 1 | Status: SHIPPED | OUTPATIENT
Start: 2024-11-06

## 2024-11-06 RX ORDER — ORAL SEMAGLUTIDE 3 MG/1
3 TABLET ORAL DAILY
Qty: 30 TABLET | Refills: 0 | Status: SHIPPED | OUTPATIENT
Start: 2024-11-06 | End: 2024-12-06

## 2024-11-06 RX ORDER — BLOOD-GLUCOSE SENSOR
1 EACH MISCELLANEOUS
Qty: 6 EACH | Refills: 3 | Status: SHIPPED | OUTPATIENT
Start: 2024-11-06

## 2024-11-06 RX ORDER — LOSARTAN POTASSIUM 50 MG/1
100 TABLET ORAL DAILY
Qty: 90 TABLET | Refills: 1 | Status: SHIPPED | OUTPATIENT
Start: 2024-11-06 | End: 2024-11-06

## 2024-11-06 SDOH — ECONOMIC STABILITY: FOOD INSECURITY: WITHIN THE PAST 12 MONTHS, THE FOOD YOU BOUGHT JUST DIDN'T LAST AND YOU DIDN'T HAVE MONEY TO GET MORE.: SOMETIMES TRUE

## 2024-11-06 SDOH — ECONOMIC STABILITY: INCOME INSECURITY: HOW HARD IS IT FOR YOU TO PAY FOR THE VERY BASICS LIKE FOOD, HOUSING, MEDICAL CARE, AND HEATING?: SOMEWHAT HARD

## 2024-11-06 SDOH — ECONOMIC STABILITY: FOOD INSECURITY: WITHIN THE PAST 12 MONTHS, YOU WORRIED THAT YOUR FOOD WOULD RUN OUT BEFORE YOU GOT MONEY TO BUY MORE.: SOMETIMES TRUE

## 2024-11-06 ASSESSMENT — ENCOUNTER SYMPTOMS
DIARRHEA: 0
ABDOMINAL PAIN: 0
NAUSEA: 0
SHORTNESS OF BREATH: 0

## 2024-11-06 NOTE — PROGRESS NOTES
Homeless in the Last Year: No       Family History   Problem Relation Age of Onset    Colon Cancer Neg Hx     Colon Polyps Neg Hx        Review of Systems   Constitutional:  Negative for fever.   Eyes:  Negative for visual disturbance.   Respiratory:  Negative for shortness of breath.    Cardiovascular:  Negative for chest pain.   Gastrointestinal:  Negative for abdominal pain, diarrhea and nausea.   Endocrine: Negative for polyuria.        Blood pressure 134/70, pulse 69, height 1.778 m (5' 10\"), weight 86.1 kg (189 lb 12.8 oz), SpO2 98%.    Physical Exam  Constitutional:       General: He is not in acute distress.     Appearance: Normal appearance. He is normal weight.   HENT:      Head: Normocephalic and atraumatic.   Eyes:      Extraocular Movements: Extraocular movements intact.      Pupils: Pupils are equal, round, and reactive to light.   Cardiovascular:      Rate and Rhythm: Normal rate and regular rhythm.      Heart sounds: No murmur heard.     No friction rub. No gallop.   Pulmonary:      Effort: Pulmonary effort is normal. No respiratory distress.      Breath sounds: Normal breath sounds. No wheezing or rales.   Abdominal:      General: Abdomen is flat. Bowel sounds are normal. There is no distension.      Palpations: Abdomen is soft.   Skin:     General: Skin is warm and dry.      Coloration: Skin is not jaundiced.   Neurological:      General: No focal deficit present.      Mental Status: He is alert and oriented to person, place, and time. Mental status is at baseline.      Cranial Nerves: No cranial nerve deficit.      Sensory: No sensory deficit.   Psychiatric:         Mood and Affect: Mood normal.         Behavior: Behavior normal.         Thought Content: Thought content normal.         Judgment: Judgment normal.          Diagnostic data:   I have reviewed recent diagnostic testing including: GFR, Anion gap, ferritin, iron, CMP, CBC, POCT glucose      Assessment and Plan:     Diagnosis Orders   1.

## 2024-11-25 ENCOUNTER — OFFICE VISIT (OUTPATIENT)
Dept: INTERNAL MEDICINE CLINIC | Age: 64
End: 2024-11-25

## 2024-11-25 VITALS — WEIGHT: 194 LBS | BODY MASS INDEX: 27.84 KG/M2

## 2024-11-25 DIAGNOSIS — E11.9 TYPE 2 DIABETES MELLITUS WITHOUT COMPLICATION, WITHOUT LONG-TERM CURRENT USE OF INSULIN (HCC): ICD-10-CM

## 2024-11-25 DIAGNOSIS — E10.9 TYPE 1 DIABETES MELLITUS WITHOUT COMPLICATION (HCC): Primary | ICD-10-CM

## 2024-11-25 DIAGNOSIS — E11.9 NEWLY DIAGNOSED DIABETES (HCC): ICD-10-CM

## 2024-11-25 NOTE — PROGRESS NOTES
[facility]  [facility]  [address] [place] [address]  [phone] (phone)  [phone] (fax)    Patient Name: Damian Vargas. Date of Birth: [id number]. MRN: [id number]      Assessment: Patient is a 64 y.o. male seen for Initial MNT visit for Diabetes.     -Nutritionally relevant labs:   Lab Results   Component Value Date/Time    LABA1C 7.6 (H) 08/21/2024 02:17 AM    LABA1C 7.6 (H) 08/20/2024 02:20 PM    GLUCOSE 114 (H) 09/30/2024 04:18 PM    GLUCOSE 165 (H) 08/25/2024 03:44 AM    CHOL 124 08/21/2024 02:17 AM    HDL 22 08/21/2024 02:17 AM    TRIG 230 (H) 08/21/2024 02:17 AM     -Blood sugar trends: was prescribed FreeStyle Ildefonso 3 but has been out of stock. Pt manually checks morning and night. Bld trends per pt am 127-187 mg/dL and evening 117- 175 mg/dL. Pt states blood sugars have been trending down since starting Rybelsus two weeks ago. At 3 mg. Pt would like to CGM due to his job and not being able to check bld sugars as often as he would like with manual.     -Food recall: OFF  Breakfast: 2 egg, tukry negron, keto bread, almond/cashew butter./ Breakfast bowl, yogurt, granola bar, berries  Am snack: varies- nuts, pretzels, cheese  Lunch: turkey/hamburger, keto bread, salad fruit, veggies/ soup, fruit.   Dinner: deli turkey/healthy choice.   Snacks: nuts, pretzels, cheese    -Main Beverages: unsweetened tea, water, at times artificially sweetened beverages    -  Current Outpatient Medications on File Prior to Visit   Medication Sig Dispense Refill    Continuous Glucose Sensor (FREESTYLE ILDEFONSO 3 SENSOR) MISC Inject 1 Units into the skin every 14 days 6 each 3    Semaglutide (RYBELSUS) 3 MG TABS Take 3 mg by mouth daily 30 tablet 0    carvedilol (COREG) 6.25 MG tablet Take 1 tablet by mouth 2 times daily (with meals) 180 tablet 1    losartan (COZAAR) 50 MG tablet Take 2 tablets by mouth daily 90 tablet 1    amLODIPine (NORVASC) 5 MG tablet Take 1 tablet

## 2024-11-25 NOTE — PATIENT INSTRUCTIONS
https://diabetes.org/food-nutrition    Eatingwell.com    Soups ( barley, lentils, beans, brown rice)    Lentil/chickpea past (high fiber, high pro)

## 2024-11-25 NOTE — PROGRESS NOTES
Damian Vargas presents for initiation of Dexcom personal CGM.      Dexcom CGM instructions completed.  Dexcom CGM initiated LEFT (NEEDS TO BE BACK OF UPPER ARM).  30 minute warm up period initiated.   Urgent low warning set at 70  Urgent high warning set at 250  System is waterproof-do not need to do anything for showers.  You should use the \"overpatch\" that comes supplied in the box with the Dexcom sensor to help keep sensor in place for 10 days.    Warm up period: 30 minute until your sensor will start recording blood sugar results  Watch the arrows --this arrow indicates if your blood sugars are trending up, trending down or    staying stable. If the [place] is red= indicates a sugar in low range                                                [place] = indicates a sugar in range                                                Orange =indicates a sugar above goal range  Change sensor every 10 days  Contact Dexcom [phone] for assistance.  Monday- Friday 5:30am-8pm PST       Patient understood & tolerated well.     Valerie Demarco MA

## 2024-12-04 ENCOUNTER — OFFICE VISIT (OUTPATIENT)
Dept: INTERNAL MEDICINE CLINIC | Age: 64
End: 2024-12-04

## 2024-12-04 VITALS
DIASTOLIC BLOOD PRESSURE: 74 MMHG | HEIGHT: 70 IN | SYSTOLIC BLOOD PRESSURE: 130 MMHG | TEMPERATURE: 97 F | BODY MASS INDEX: 27.32 KG/M2 | WEIGHT: 190.8 LBS | HEART RATE: 68 BPM

## 2024-12-04 VITALS
DIASTOLIC BLOOD PRESSURE: 76 MMHG | SYSTOLIC BLOOD PRESSURE: 142 MMHG | TEMPERATURE: 98.2 F | BODY MASS INDEX: 27.72 KG/M2 | WEIGHT: 193.6 LBS | HEART RATE: 71 BPM | HEIGHT: 70 IN

## 2024-12-04 DIAGNOSIS — E10.9 TYPE 1 DIABETES MELLITUS WITHOUT COMPLICATION (HCC): Primary | ICD-10-CM

## 2024-12-04 DIAGNOSIS — E11.9 NEWLY DIAGNOSED DIABETES (HCC): Primary | ICD-10-CM

## 2024-12-04 DIAGNOSIS — E10.9 TYPE 1 DIABETES MELLITUS WITHOUT COMPLICATION (HCC): ICD-10-CM

## 2024-12-04 DIAGNOSIS — E11.9 TYPE 2 DIABETES MELLITUS WITHOUT COMPLICATION, WITHOUT LONG-TERM CURRENT USE OF INSULIN (HCC): ICD-10-CM

## 2024-12-04 LAB — HBA1C MFR BLD: 6.3 % (ref 4.3–5.7)

## 2024-12-04 PROCEDURE — NBSRV NON-BILLABLE SERVICE: Performed by: REGISTERED NURSE

## 2024-12-04 RX ORDER — VIT C/B6/B5/MAGNESIUM/HERB 173 50-5-6-5MG
1000 CAPSULE ORAL DAILY
COMMUNITY

## 2024-12-04 ASSESSMENT — ENCOUNTER SYMPTOMS
CONSTIPATION: 0
SHORTNESS OF BREATH: 0
DIARRHEA: 0
ABDOMINAL PAIN: 0

## 2024-12-04 NOTE — PROGRESS NOTES
1960    Chief Complaint   Patient presents with    Diabetes     1 month    Hypertension       Patient presents for one month follow-up for diabetes medication management. He recently switched from dexicom to mindy CGM. He reports no issues with his CGM, along with no issues with the rybelsus. ROS otherwise negative. He is scheduled for Cholecystectomy on 12/2024.     Damian Vargas is a 64 y.o. male with a PMHx of IDDM and HTN who presented to [facility] on [facility]24 with nausea, vomiting, diarrhea and jaundice. Was found to have cholestatic hepatitis secondary to cholelithiasis while inpatient. Patient was discharged on metformin, amlodipine, losartan, and carvedilol. He had a colonoscopy in October 2024, which revealed no polyps. He is scheduled for cholecystectomy on 12/19/24.            Past Medical History:   Diagnosis Date    Hypertension     Mild aortic stenosis 08/2024    T2DM (type 2 diabetes mellitus) (HCC) 08/2024    A1C 7.6    Transaminitis        No past surgical history on file.    Current Outpatient Medications   Medication Sig Dispense Refill    turmeric (QC TUMERIC COMPLEX) 500 MG CAPS Take 2 capsules by mouth daily      Omega-3 Fatty Acids (FISH OIL BURP-LESS PO) Take 2 capsules by mouth daily      GARLIC PO Take 1 capsule by mouth daily      Continuous Glucose Sensor (FREESTYLE MINDY 3 SENSOR) MISC Inject 1 Units into the skin every 14 days 6 each 3    Semaglutide (RYBELSUS) 3 MG TABS Take 3 mg by mouth daily 30 tablet 0    carvedilol (COREG) 6.25 MG tablet Take 1 tablet by mouth 2 times daily (with meals) 180 tablet 1    losartan (COZAAR) 50 MG tablet Take 2 tablets by mouth daily 90 tablet 1    amLODIPine (NORVASC) 5 MG tablet Take 1 tablet by mouth in the morning and at bedtime 30 tablet 3    glucose monitoring kit 1 kit by Does not apply route daily Check sugar every morning.  Write down in log. 1 kit 0    Lancets MISC 1 each by Does not apply route 2 times daily 300 each 1

## 2024-12-04 NOTE — PATIENT INSTRUCTIONS
Make sure Dahlia has made an appointment to get your eyes            Checked  Think about getting the flu shot              And the Pneumonia vaccine (Prevnar 20)  Continue staying focused on 60grams carb or less per           Meal. Snacks --under 20 grams carb       *if still hungry--think low carb veggies or a protein  Set your goal for activity to at least 8,000 steps per day          Goal:  10,000 steps per day  Blood sugar goals  before meals                        under 150 2 hours after eating  Keep up the great work!!   A1C today 6.3%  Talk with your doctor about Rybelsus 7mg for optimal               Control and assistance            After eating

## 2024-12-04 NOTE — PROGRESS NOTES
[facility]  [address] [place] [address]  [phone] (phone)  [phone] (fax)    Patient ID: Damian Vargas 1960  Referring Provider: Dr. Muro     Patient's name and  were verified.    Subjective:    He presents for a follow-up diabetic visit. He has type 2 diabetes mellitus. He is compliant some of the time.  Assessment:     Lab Results   Component Value Date/Time    LABA1C 7.6 2024 02:17 AM    BUN 29 2024 04:18 PM    CREATININE 0.9 2024 04:18 PM     Vitals:    24 0811 24 1139   BP: (!) 154/76 (!) 142/76   Site: Right Upper Arm Right Upper Arm   Position: Sitting Sitting   Pulse: 71    Temp: 98.2 °F (36.8 °C)    Weight: 87.8 kg (193 lb 9.6 oz)    Height: 1.778 m (5' 10\")      Wt Readings from Last 3 Encounters:   24 87.8 kg (193 lb 9.6 oz)   24 88 kg (194 lb)   24 86.1 kg (189 lb 12.8 oz)     Ht Readings from Last 3 Encounters:   24 1.778 m (5' 10\")   24 1.778 m (5' 10\")   10/23/24 1.778 m (5' 10\")     Est, Glom Filt Rate   Date Value Ref Range Status   2024 > 90 >60 ml/min/1.73m2 Final     Diabetes Pharmacotherapy:  Rybelsus 3mg daily    Glucose Trends:   Current monitoring regimen: Freestyle Ildefonso 3 CGM - checks 4+ times daily; also               Had a Dexcom G7 sample  Home blood sugar trends:    -V. High: 0%, High: 12%, Target: 88%, Low: 0%, V. Low: 0%   -Average Glucose: 144mg/dL; GMI: --%;  %time CGM active 12%              -overall, good glucose control; random excursion with larger carb meal  Any episodes of hypoglycemia? yes - once during the work day 56 without s/s   -Treats with blueberries    Lifestyle Factors:   Previous visit with dietician: yes - 2024  Current diet: B: gas station egg/saus/biscuit                                        Bkfst bowl            L: pork roast sandwich; yogurt, blueberries, asparagus                       D: arby;s beef n cheddar                       Snacks:

## 2024-12-05 DIAGNOSIS — E10.9 TYPE 1 DIABETES MELLITUS WITHOUT COMPLICATION (HCC): ICD-10-CM

## 2024-12-05 RX ORDER — ORAL SEMAGLUTIDE 3 MG/1
3 TABLET ORAL DAILY
Qty: 30 TABLET | Refills: 3 | Status: SHIPPED | OUTPATIENT
Start: 2024-12-05 | End: 2025-01-04

## 2024-12-14 NOTE — H&P
Damian Vargas (:  1960)      ASSESSMENT:  1.  Chronic calculus cholecystitis  2.  Fatty liver disease     PLAN:  1. Schedule Damian for robotic possible open cholecystectomy.  2. He will undergo pre-operative clearance per anesthesia guidelines with risk factors listed under the past medical history diagnosis & problem list.  3. The risks, benefits and alternatives were discussed with Damian including non-operative management.  The pros and cons of robotic, laparoscopic and open techniques were discussed.  All questions answered. He understands and wishes to proceed with surgical intervention.  4. Restrictions discussed with Damian and he expresses understanding.  5. He is advised to call back directly if there are further questions/concerns, or if his symptoms worsen prior to surgery.  6.  Dietary modification/restrictions discussed with patient in regards to biliary disease.     SUBJECTIVE/OBJECTIVE:          Chief Complaint   Patient presents with    Surgical Consult       NP ref by Dr. Carrasco - Cholelithiasis.       LEILA Salgado is a 64-year-old male who presents for initial evaluation due to gallbladder disease.  Recent admission due to abdominal pain, nausea, vomiting and diarrhea.  Found to have elevated liver enzymes at that time.  Underwent extensive workup and found to have chronic calculus cholecystitis.  MRCP did not demonstrate any choledocholithiasis at that time.  He was managed conservatively and was able to be discharged to home.  He works as a .  Initially thought he had some gastroenteritis only.  Currently, he states he is feeling much better.  Has already changed dietary intake which seems to help.  Denies any major abdominal surgeries in the past.  History diabetes mellitus.  Most recent hemoglobin A1c 7.6.  Denies chest pain or shortness of breath.  No unexplained weight loss.  No fever, chills or sweats.  Denies any new urinary complaints.  Discomfort is always been in the  Physician Reading Date Result Priority   Garland Vargas MD  [phone] 8/21/2024        Narrative & Impression  PROCEDURE: NM HEPATOBILIARY SCAN W PHARMACOLOGICAL INTERVENTION     CLINICAL INFORMATION: Elevated liver function tests, jaundice     Radiopharmaceutical injection: 9.9 mCi Tc 99m mebrofenin, intravenously.     TECHNIQUE: Anterior images of the abdomen were obtained for 60 minutes following  radiopharmaceutical administration. 3.6 mg of morphine sulfate were administered  and additional images were obtained in multiple projections.     COMPARISON: None     FINDINGS: There is normal hepatic uptake of radiotracer. Activity is present in  the common bile duct and small bowel by 10 minutes. The gallbladder is not  visualized prior to morphine administration. There is faint visualization of the  gallbladder following morphine administration.     IMPRESSION:     Patent cystic and common bile ducts without evidence of acute cholecystitis.           **This report has been created using voice recognition software. It may contain  minor errors which are inherent in voice recognition technology.**        Electronically signed by Dr. Garland Vargas             Exam Ended: 08/21/24 10:14 EDT Last Resulted: 08/21/24 10:24 EDT      MRI ABDOMEN W WO CONTRAST MRCP  Order: [id number]  Status: Final result       Visible to patient: Yes (seen)       Next appt: 10/05/2024 at 08:30 AM in Gastroenterology (Jay Dailey MD)    0 Result Notes  Details     Reading Physician Reading Date Result Priority   Rowan Blas MD  [phone] 8/21/2024        Narrative & Impression  PROCEDURE: MRI ABDOMEN W WO CONTRAST MRCP     CLINICAL INFORMATION Brenna CBD stone?.     COMPARISON: CT scan of the abdomen and pelvis dated 8/20/2024. Gallbladder  ultrasound dated 8/20/2024. Radionuclide hepatobiliary scan obtained on the same  day..     TECHNIQUE: MRI scan was carried out through the abdomen before and after  intravenous

## 2024-12-18 ENCOUNTER — PREP FOR PROCEDURE (OUTPATIENT)
Dept: SURGERY | Age: 64
End: 2024-12-18

## 2024-12-18 RX ORDER — SODIUM CHLORIDE 9 MG/ML
INJECTION, SOLUTION INTRAVENOUS PRN
Status: CANCELLED | OUTPATIENT
Start: 2024-12-18

## 2024-12-18 RX ORDER — SODIUM CHLORIDE 0.9 % (FLUSH) 0.9 %
5-40 SYRINGE (ML) INJECTION PRN
Status: CANCELLED | OUTPATIENT
Start: 2024-12-18

## 2024-12-18 RX ORDER — SODIUM CHLORIDE 9 MG/ML
INJECTION, SOLUTION INTRAVENOUS CONTINUOUS
Status: CANCELLED | OUTPATIENT
Start: 2024-12-18

## 2024-12-18 RX ORDER — INDOCYANINE GREEN AND WATER 25 MG
2.5 KIT INJECTION
Status: CANCELLED | OUTPATIENT
Start: 2024-12-18 | End: 2024-12-19

## 2024-12-18 RX ORDER — SODIUM CHLORIDE 0.9 % (FLUSH) 0.9 %
5-40 SYRINGE (ML) INJECTION EVERY 12 HOURS SCHEDULED
Status: CANCELLED | OUTPATIENT
Start: 2024-12-18

## 2024-12-19 ENCOUNTER — ANESTHESIA (OUTPATIENT)
Dept: OPERATING ROOM | Age: 64
End: 2024-12-19
Payer: COMMERCIAL

## 2024-12-19 ENCOUNTER — HOSPITAL ENCOUNTER (OUTPATIENT)
Age: 64
Setting detail: OUTPATIENT SURGERY
Discharge: HOME | End: 2024-12-19
Attending: SURGERY | Admitting: SURGERY
Payer: COMMERCIAL

## 2024-12-19 ENCOUNTER — ANESTHESIA EVENT (OUTPATIENT)
Dept: OPERATING ROOM | Age: 64
End: 2024-12-19
Payer: COMMERCIAL

## 2024-12-19 VITALS
RESPIRATION RATE: 18 BRPM | BODY MASS INDEX: 26.52 KG/M2 | OXYGEN SATURATION: 99 % | HEIGHT: 70 IN | WEIGHT: 185.25 LBS | HEART RATE: 76 BPM | DIASTOLIC BLOOD PRESSURE: 83 MMHG | SYSTOLIC BLOOD PRESSURE: 163 MMHG | TEMPERATURE: 97 F

## 2024-12-19 DIAGNOSIS — Z90.49 S/P LAPAROSCOPIC CHOLECYSTECTOMY: Primary | ICD-10-CM

## 2024-12-19 DIAGNOSIS — K80.10 CALCULUS OF GALLBLADDER WITH CHOLECYSTITIS WITHOUT BILIARY OBSTRUCTION, UNSPECIFIED CHOLECYSTITIS ACUITY: ICD-10-CM

## 2024-12-19 LAB
GLUCOSE BLD STRIP.AUTO-MCNC: 102 MG/DL (ref 70–108)
POTASSIUM SERPL-SCNC: 4.4 MEQ/L (ref 3.5–5.2)

## 2024-12-19 PROCEDURE — 47562 LAPAROSCOPIC CHOLECYSTECTOMY: CPT | Performed by: SURGERY

## 2024-12-19 PROCEDURE — C1889 IMPLANT/INSERT DEVICE, NOC: HCPCS | Performed by: SURGERY

## 2024-12-19 PROCEDURE — 7100000001 HC PACU RECOVERY - ADDTL 15 MIN: Performed by: SURGERY

## 2024-12-19 PROCEDURE — 3600000009 HC SURGERY ROBOT BASE: Performed by: SURGERY

## 2024-12-19 PROCEDURE — 6360000002 HC RX W HCPCS: Performed by: NURSE ANESTHETIST, CERTIFIED REGISTERED

## 2024-12-19 PROCEDURE — 7100000000 HC PACU RECOVERY - FIRST 15 MIN: Performed by: SURGERY

## 2024-12-19 PROCEDURE — 2709999900 HC NON-CHARGEABLE SUPPLY: Performed by: SURGERY

## 2024-12-19 PROCEDURE — 7100000010 HC PHASE II RECOVERY - FIRST 15 MIN: Performed by: SURGERY

## 2024-12-19 PROCEDURE — 2500000003 HC RX 250 WO HCPCS: Performed by: NURSE PRACTITIONER

## 2024-12-19 PROCEDURE — 3700000000 HC ANESTHESIA ATTENDED CARE: Performed by: SURGERY

## 2024-12-19 PROCEDURE — 84132 ASSAY OF SERUM POTASSIUM: CPT

## 2024-12-19 PROCEDURE — 36415 COLL VENOUS BLD VENIPUNCTURE: CPT

## 2024-12-19 PROCEDURE — 2720000010 HC SURG SUPPLY STERILE: Performed by: SURGERY

## 2024-12-19 PROCEDURE — 6360000002 HC RX W HCPCS

## 2024-12-19 PROCEDURE — 7100000011 HC PHASE II RECOVERY - ADDTL 15 MIN: Performed by: SURGERY

## 2024-12-19 PROCEDURE — 88304 TISSUE EXAM BY PATHOLOGIST: CPT

## 2024-12-19 PROCEDURE — 82948 REAGENT STRIP/BLOOD GLUCOSE: CPT

## 2024-12-19 PROCEDURE — 6360000002 HC RX W HCPCS: Performed by: NURSE PRACTITIONER

## 2024-12-19 PROCEDURE — 3600000019 HC SURGERY ROBOT ADDTL 15MIN: Performed by: SURGERY

## 2024-12-19 PROCEDURE — 6360000002 HC RX W HCPCS: Performed by: ANESTHESIOLOGY

## 2024-12-19 PROCEDURE — S2900 ROBOTIC SURGICAL SYSTEM: HCPCS | Performed by: SURGERY

## 2024-12-19 PROCEDURE — 2580000003 HC RX 258: Performed by: NURSE PRACTITIONER

## 2024-12-19 PROCEDURE — 2500000003 HC RX 250 WO HCPCS: Performed by: NURSE ANESTHETIST, CERTIFIED REGISTERED

## 2024-12-19 PROCEDURE — 6370000000 HC RX 637 (ALT 250 FOR IP): Performed by: SURGERY

## 2024-12-19 PROCEDURE — 3700000001 HC ADD 15 MINUTES (ANESTHESIA): Performed by: SURGERY

## 2024-12-19 DEVICE — CLIP INT L POLYMER LOK LIG HEM O LOK (6EA/PK): Type: IMPLANTABLE DEVICE | Status: FUNCTIONAL

## 2024-12-19 RX ORDER — MORPHINE SULFATE 2 MG/ML
2 INJECTION, SOLUTION INTRAMUSCULAR; INTRAVENOUS
Status: DISCONTINUED | OUTPATIENT
Start: 2024-12-19 | End: 2024-12-19 | Stop reason: HOSPADM

## 2024-12-19 RX ORDER — KETOROLAC TROMETHAMINE 10 MG/1
10 TABLET, FILM COATED ORAL EVERY 6 HOURS PRN
Qty: 20 TABLET | Refills: 0 | Status: SHIPPED | OUTPATIENT
Start: 2024-12-19 | End: 2024-12-26

## 2024-12-19 RX ORDER — SODIUM CHLORIDE 0.9 % (FLUSH) 0.9 %
5-40 SYRINGE (ML) INJECTION EVERY 12 HOURS SCHEDULED
Status: DISCONTINUED | OUTPATIENT
Start: 2024-12-19 | End: 2024-12-19 | Stop reason: HOSPADM

## 2024-12-19 RX ORDER — SODIUM CHLORIDE 9 MG/ML
INJECTION, SOLUTION INTRAVENOUS PRN
Status: CANCELLED | OUTPATIENT
Start: 2024-12-19

## 2024-12-19 RX ORDER — FENTANYL CITRATE 50 UG/ML
INJECTION, SOLUTION INTRAMUSCULAR; INTRAVENOUS
Status: DISCONTINUED | OUTPATIENT
Start: 2024-12-19 | End: 2024-12-19 | Stop reason: SDUPTHER

## 2024-12-19 RX ORDER — INDOCYANINE GREEN AND WATER 25 MG
2.5 KIT INJECTION
Status: COMPLETED | OUTPATIENT
Start: 2024-12-19 | End: 2024-12-19

## 2024-12-19 RX ORDER — M-VIT,TX,IRON,MINS/CALC/FOLIC 27MG-0.4MG
1 TABLET ORAL DAILY
COMMUNITY

## 2024-12-19 RX ORDER — DEXAMETHASONE SODIUM PHOSPHATE 10 MG/ML
INJECTION, EMULSION INTRAMUSCULAR; INTRAVENOUS
Status: DISCONTINUED | OUTPATIENT
Start: 2024-12-19 | End: 2024-12-19 | Stop reason: SDUPTHER

## 2024-12-19 RX ORDER — PHENYLEPHRINE HCL IN 0.9% NACL 1 MG/10 ML
SYRINGE (ML) INTRAVENOUS
Status: DISCONTINUED | OUTPATIENT
Start: 2024-12-19 | End: 2024-12-19 | Stop reason: SDUPTHER

## 2024-12-19 RX ORDER — ONDANSETRON 2 MG/ML
4 INJECTION INTRAMUSCULAR; INTRAVENOUS EVERY 6 HOURS PRN
Status: CANCELLED | OUTPATIENT
Start: 2024-12-19

## 2024-12-19 RX ORDER — HYDROCODONE BITARTRATE AND ACETAMINOPHEN 5; 325 MG/1; MG/1
1 TABLET ORAL EVERY 6 HOURS PRN
Qty: 28 TABLET | Refills: 0 | Status: SHIPPED | OUTPATIENT
Start: 2024-12-19 | End: 2024-12-26

## 2024-12-19 RX ORDER — ROCURONIUM BROMIDE 10 MG/ML
INJECTION, SOLUTION INTRAVENOUS
Status: DISCONTINUED | OUTPATIENT
Start: 2024-12-19 | End: 2024-12-19 | Stop reason: SDUPTHER

## 2024-12-19 RX ORDER — FENTANYL CITRATE 50 UG/ML
50 INJECTION, SOLUTION INTRAMUSCULAR; INTRAVENOUS EVERY 5 MIN PRN
Status: COMPLETED | OUTPATIENT
Start: 2024-12-19 | End: 2024-12-19

## 2024-12-19 RX ORDER — SODIUM CHLORIDE 9 MG/ML
INJECTION, SOLUTION INTRAVENOUS CONTINUOUS
Status: DISCONTINUED | OUTPATIENT
Start: 2024-12-19 | End: 2024-12-19

## 2024-12-19 RX ORDER — SODIUM CHLORIDE 0.9 % (FLUSH) 0.9 %
5-40 SYRINGE (ML) INJECTION EVERY 12 HOURS SCHEDULED
Status: CANCELLED | OUTPATIENT
Start: 2024-12-19

## 2024-12-19 RX ORDER — LIDOCAINE HYDROCHLORIDE 20 MG/ML
INJECTION, SOLUTION INTRAVENOUS
Status: DISCONTINUED | OUTPATIENT
Start: 2024-12-19 | End: 2024-12-19 | Stop reason: SDUPTHER

## 2024-12-19 RX ORDER — FENTANYL CITRATE 50 UG/ML
INJECTION, SOLUTION INTRAMUSCULAR; INTRAVENOUS
Status: COMPLETED
Start: 2024-12-19 | End: 2024-12-19

## 2024-12-19 RX ORDER — MIDAZOLAM HYDROCHLORIDE 1 MG/ML
INJECTION, SOLUTION INTRAMUSCULAR; INTRAVENOUS
Status: DISCONTINUED | OUTPATIENT
Start: 2024-12-19 | End: 2024-12-19 | Stop reason: SDUPTHER

## 2024-12-19 RX ORDER — HYDROCODONE BITARTRATE AND ACETAMINOPHEN 5; 325 MG/1; MG/1
1 TABLET ORAL EVERY 4 HOURS PRN
Status: CANCELLED | OUTPATIENT
Start: 2024-12-19

## 2024-12-19 RX ORDER — PROPOFOL 10 MG/ML
INJECTION, EMULSION INTRAVENOUS
Status: DISCONTINUED | OUTPATIENT
Start: 2024-12-19 | End: 2024-12-19 | Stop reason: SDUPTHER

## 2024-12-19 RX ORDER — ONDANSETRON 2 MG/ML
INJECTION INTRAMUSCULAR; INTRAVENOUS
Status: DISCONTINUED | OUTPATIENT
Start: 2024-12-19 | End: 2024-12-19 | Stop reason: SDUPTHER

## 2024-12-19 RX ORDER — MORPHINE SULFATE 4 MG/ML
4 INJECTION, SOLUTION INTRAMUSCULAR; INTRAVENOUS
Status: DISCONTINUED | OUTPATIENT
Start: 2024-12-19 | End: 2024-12-19 | Stop reason: HOSPADM

## 2024-12-19 RX ORDER — HYDROCODONE BITARTRATE AND ACETAMINOPHEN 5; 325 MG/1; MG/1
2 TABLET ORAL EVERY 4 HOURS PRN
Status: CANCELLED | OUTPATIENT
Start: 2024-12-19

## 2024-12-19 RX ORDER — SODIUM CHLORIDE 0.9 % (FLUSH) 0.9 %
5-40 SYRINGE (ML) INJECTION PRN
Status: DISCONTINUED | OUTPATIENT
Start: 2024-12-19 | End: 2024-12-19 | Stop reason: HOSPADM

## 2024-12-19 RX ORDER — SODIUM CHLORIDE 9 MG/ML
INJECTION, SOLUTION INTRAVENOUS PRN
Status: DISCONTINUED | OUTPATIENT
Start: 2024-12-19 | End: 2024-12-19 | Stop reason: HOSPADM

## 2024-12-19 RX ORDER — KETOROLAC TROMETHAMINE 30 MG/ML
INJECTION, SOLUTION INTRAMUSCULAR; INTRAVENOUS
Status: DISCONTINUED | OUTPATIENT
Start: 2024-12-19 | End: 2024-12-19 | Stop reason: SDUPTHER

## 2024-12-19 RX ORDER — SODIUM CHLORIDE 0.9 % (FLUSH) 0.9 %
5-40 SYRINGE (ML) INJECTION PRN
Status: CANCELLED | OUTPATIENT
Start: 2024-12-19

## 2024-12-19 RX ORDER — HYDROCODONE BITARTRATE AND ACETAMINOPHEN 5; 325 MG/1; MG/1
1 TABLET ORAL ONCE
Status: COMPLETED | OUTPATIENT
Start: 2024-12-19 | End: 2024-12-19

## 2024-12-19 RX ORDER — ONDANSETRON 4 MG/1
4 TABLET, ORALLY DISINTEGRATING ORAL EVERY 8 HOURS PRN
Status: CANCELLED | OUTPATIENT
Start: 2024-12-19

## 2024-12-19 RX ADMIN — FENTANYL CITRATE 50 MCG: 50 INJECTION, SOLUTION INTRAMUSCULAR; INTRAVENOUS at 08:44

## 2024-12-19 RX ADMIN — SODIUM CHLORIDE: 9 INJECTION, SOLUTION INTRAVENOUS at 06:45

## 2024-12-19 RX ADMIN — WATER 2000 MG: 1 INJECTION INTRAMUSCULAR; INTRAVENOUS; SUBCUTANEOUS at 07:40

## 2024-12-19 RX ADMIN — FENTANYL CITRATE 50 MCG: 50 INJECTION, SOLUTION INTRAMUSCULAR; INTRAVENOUS at 08:39

## 2024-12-19 RX ADMIN — FENTANYL CITRATE 50 MCG: 50 INJECTION INTRAMUSCULAR; INTRAVENOUS at 08:20

## 2024-12-19 RX ADMIN — FENTANYL CITRATE 50 MCG: 50 INJECTION INTRAMUSCULAR; INTRAVENOUS at 08:24

## 2024-12-19 RX ADMIN — KETOROLAC TROMETHAMINE 30 MG: 30 INJECTION, SOLUTION INTRAMUSCULAR at 08:26

## 2024-12-19 RX ADMIN — MIDAZOLAM 2 MG: 1 INJECTION INTRAMUSCULAR; INTRAVENOUS at 07:31

## 2024-12-19 RX ADMIN — Medication 100 MCG: at 07:42

## 2024-12-19 RX ADMIN — HYDROCODONE BITARTRATE AND ACETAMINOPHEN 1 TABLET: 5; 325 TABLET ORAL at 09:27

## 2024-12-19 RX ADMIN — FENTANYL CITRATE 50 MCG: 50 INJECTION INTRAMUSCULAR; INTRAVENOUS at 07:46

## 2024-12-19 RX ADMIN — SUGAMMADEX 200 MG: 100 INJECTION, SOLUTION INTRAVENOUS at 08:20

## 2024-12-19 RX ADMIN — FENTANYL CITRATE 100 MCG: 50 INJECTION INTRAMUSCULAR; INTRAVENOUS at 07:34

## 2024-12-19 RX ADMIN — ROCURONIUM BROMIDE 50 MG: 10 INJECTION INTRAVENOUS at 07:34

## 2024-12-19 RX ADMIN — INDOCYANINE GREEN 2.5 MG: KIT INTRAVENOUS at 06:58

## 2024-12-19 RX ADMIN — LIDOCAINE HYDROCHLORIDE 80 MG: 20 INJECTION, SOLUTION INTRAVENOUS at 07:34

## 2024-12-19 RX ADMIN — ROCURONIUM BROMIDE 20 MG: 10 INJECTION INTRAVENOUS at 08:05

## 2024-12-19 RX ADMIN — SODIUM CHLORIDE: 9 INJECTION, SOLUTION INTRAVENOUS at 08:29

## 2024-12-19 RX ADMIN — ONDANSETRON 4 MG: 2 INJECTION INTRAMUSCULAR; INTRAVENOUS at 08:20

## 2024-12-19 RX ADMIN — PROPOFOL 170 MG: 10 INJECTION, EMULSION INTRAVENOUS at 07:34

## 2024-12-19 RX ADMIN — DEXAMETHASONE SODIUM PHOSPHATE 8 MG: 10 INJECTION, EMULSION INTRAMUSCULAR; INTRAVENOUS at 07:38

## 2024-12-19 ASSESSMENT — PAIN SCALES - GENERAL
PAINLEVEL_OUTOF10: 7
PAINLEVEL_OUTOF10: 7
PAINLEVEL_OUTOF10: 4
PAINLEVEL_OUTOF10: 0
PAINLEVEL_OUTOF10: 4
PAINLEVEL_OUTOF10: 5
PAINLEVEL_OUTOF10: 6
PAINLEVEL_OUTOF10: 4
PAINLEVEL_OUTOF10: 4
PAINLEVEL_OUTOF10: 5

## 2024-12-19 ASSESSMENT — PAIN DESCRIPTION - DESCRIPTORS
DESCRIPTORS: ACHING

## 2024-12-19 ASSESSMENT — PAIN DESCRIPTION - PAIN TYPE
TYPE: SURGICAL PAIN

## 2024-12-19 ASSESSMENT — PAIN DESCRIPTION - LOCATION
LOCATION: ABDOMEN

## 2024-12-19 ASSESSMENT — PAIN DESCRIPTION - ORIENTATION
ORIENTATION: MID

## 2024-12-19 ASSESSMENT — PAIN DESCRIPTION - FREQUENCY
FREQUENCY: CONTINUOUS
FREQUENCY: CONTINUOUS

## 2024-12-19 NOTE — PROGRESS NOTES
Pt returned to SDS room 5. Vitals and assessment as charted. 0.9 infusing, @250ml to count from PACU. Pt has crackers and water. Family at the bedside. Pt and family verbalized understanding of discharge criteria and call light use. Call light in reach.

## 2024-12-19 NOTE — INTERVAL H&P NOTE
Update History & Physical    The patient's History and Physical was reviewed with the patient and I examined the patient. There was no change. The surgical site was confirmed by the patient and me.     Plan: The risks, benefits, expected outcome, and alternative to the recommended procedure have been discussed with the patient. Patient understands and wants to proceed with the procedure.     Electronically signed by Jesus Chan MD on 12/19/2024 at 6:19 AM

## 2024-12-19 NOTE — PROGRESS NOTES
0830- pt to pacu, resp easy and unlabored, VSS, pt appears in no acute distress  0839- fentanyl given  0844- fentanyl given  0855- pt resting in bed with eyes closed, resp easy and unlabored, VSS, pt appears in no acute distress, pt states pain is tolerable  0910- pt meets criteria for discharge from pacu, pt transported to hospitals in stable condition

## 2024-12-19 NOTE — ANESTHESIA PRE PROCEDURE
Department of Anesthesiology  Preprocedure Note       Name:  Damian Vargas   Age:  64 y.o.  :  1960                                          MRN:  [id number]         Date:  2024      Surgeon: Surgeon(s):  Jesus Chan MD    Procedure: Procedure(s):  Robotic Cholecystectomy Poss Open    Medications prior to admission:   Prior to Admission medications    Medication Sig Start Date End Date Taking? Authorizing Provider   Multiple Vitamins-Minerals (THERAPEUTIC MULTIVITAMIN-MINERALS) tablet Take 1 tablet by mouth daily   Yes Angella Garcia MD   carvedilol (COREG) 6.25 MG tablet Take 1 tablet by mouth 2 times daily (with meals) 24  Yes Oscar Muro III, DO   losartan (COZAAR) 50 MG tablet Take 2 tablets by mouth daily 24  Yes Oscar Muro III, DO   amLODIPine (NORVASC) 5 MG tablet Take 1 tablet by mouth in the morning and at bedtime 10/28/24  Yes Arben Ontiveros MD   Semaglutide (RYBELSUS) 3 MG TABS Take 3 mg by mouth daily 24  Johnson Jackson DO   turmeric (QC TUMERIC COMPLEX) 500 MG CAPS Take 2 capsules by mouth daily    Angella Garcia MD   Omega-3 Fatty Acids (FISH OIL BURP-LESS PO) Take 2 capsules by mouth daily    Angella Garcia MD   GARLIC PO Take 1 capsule by mouth daily    Angella Garcia MD   Continuous Glucose Sensor (FREESTYLE ANT 3 SENSOR) MISC Inject 1 Units into the skin every 14 days 24   Oscar Muro III, DO   glucose monitoring kit 1 kit by Does not apply route daily Check sugar every morning.  Write down in log. 24   Champ Carrasco, DO   Lancets MISC 1 each by Does not apply route 2 times daily 24   Oscar Muro III DO   blood glucose monitor strips Test 1 time a day & as needed for symptoms of irregular blood glucose. Dispense sufficient amount for indicated testing frequency plus additional to accommodate PRN testing needs. 24   Oscar Muro III DO       Current medications:    Current

## 2024-12-19 NOTE — PROGRESS NOTES
Patient oriented to Same Day department and admitted to Same Day Surgery room 5.   Patient verbalized approval for first name, last initial with physician name on unit whiteboard.     Plan of care reviewed with patient.   Patient room whiteboard filled out and discussed with patient and responsible adult.   Patient and responsible adult offered Same Day Welcome Packet to review.    Call light in reach.   Bed in lowest position, locked, with one bed rail up.   SCDs and warming blanket in place.  Appropriate arm bands on patient.   Bathroom offered.   All questions and concerns of patient addressed.        Meds to Beds:   Patient informed of [facility] Meds to Beds program during admission. Patient is agreeable to use of program.   Contact information for the pharmacy and the Meds to Beds program:   Name: Betsey   Relationship to patient:friend/acquaintance   Phone number: [phone]

## 2024-12-19 NOTE — DISCHARGE INSTRUCTIONS
DR. NOLAND'S DISCHARGE INSTRUCTIONS    Pt Name: Damian Vargas  Medical Record Number: [id number]  Today's Date: 12/19/2024    GENERAL ANESTHESIA OR SEDATION  1. Do not drive or operate hazardous machinery for 24 hours.  2. Do not make important business or personal decisions for 24 hours.  3. Do not drink alcoholic beverages or use tobacco for 24 hours.    ACTIVITY INSTRUCTIONS:  [] Rest today. Resume light to normal activity tomorrow.   [] You may resume normal activity tomorrow. Do not engage in strenuous activity that may place stress on your incision.  [x] Do not drive for 3-5 days or while taking the pain medication. Avoid heavy lifting, tugging, pullings greater than 10 lbs until seen in the office.      DIET INSTRUCTIONS:  [x]Begin with clear liquids. If not nauseated, may increase to a low-fat diet when you desire. Greasy and spicy foods are not advised.  [x]Regular diet as tolerated.  []Other:     MEDICATIONS  [x]Prescription sent with you to be used as directed.   [x]Norco-narcotic medication for pain    [x]Toradol (NASAID-non narcotic medication) Alternate with Norco. For pain.  Do not drink alcohol or drive while taking these medications. You may experience dizziness or drowsiness with these medications. You may also experience constipation which can be relieved with stool softners or laxatives.  **Pain medication at discharge - use only as prescribed- refills may be available to you at your follow up appointments if needed and warranted.  Narcotics should be used for only short term and we highly encouraged our patients to wean off appropriately and use other means for pain such as non pharmacologic measures and over the counter tylenol or ibuprofen if no restrictions apply. We do  know that surgical pain is real and will not hesitate to help eliminate some of your discomfort. However we will not be able to completely make you pain free and it is important to determine what pain level is tolerable for  you **  [x]You may resume your daily prescription medication schedule unless otherwise specified.    Use Colace and MiraLAX asneeded to prevent constipation.  Do not allow yourself to become constipated.    Drink at least 64 ounces of liquids per day.    WOUND/DRESSING INSTRUCTIONS:  Always ensure you and your care giver clean hands before and after caring for the wound.    [x] Allow surgical glue to fall off on their own.   [x] Ice operative site for 20 minutes 4 times a day as needed.     [x] May wash over incision in shower, but do not soak in a bath.    ABDOMINAL/LAPAROSCOPIC SURGERY  [x]You are encouraged to get up and move around as this helps with circulation, prevents blood clots from forming and speeds up the healing process. Call the office if you develop pain or swelling in your legs. Do not massage sore muscles in the legs.  [x]Breath deeply and cough from time to time. This helps to clear your lungs and helps prevent pneumonia.  [x]Supporting your incision with a pillow or your hand helps to minimize discomfort and pain.  [x]Laparoscopic patients may develop shoulder pain in the first 48 hours from the gas used during the procedure a heating pad may help alleviate this discomfort.    FOLLOW-UP CARE. SPECIFICALLY WATCH FOR:   Fever over 101 degrees by mouth   Increased redness, warmth, hardness at operative site.   Blood soaked dressing (small amounts of oozing may be normal.)   Increased or progressive drainage from the surgical area   Inability to urinate or blood in the urine   Pain not relieved by the medications ordered   Persistent nausea and/or vomiting, unable to retain fluids.   Pain or swelling in your legs.   Shortness of breath.  Call the office if you develop any of the above symptoms.     FOLLOW-UP APPOINTMENT   []1 week   [x]2 weeks:    []Other    Call my office if you have any problem that concerns you [phone]. After hours, you can reach the answering service via the office phone

## 2024-12-19 NOTE — PROGRESS NOTES
Lump on top L incision site. Taye WATT assessed site and stated to keep patient till she can reassess patient.

## 2024-12-19 NOTE — ANESTHESIA POSTPROCEDURE EVALUATION
Department of Anesthesiology  Postprocedure Note    Patient: Damian Vargas  MRN: [id number]  YOB: 1960  Date of evaluation: 12/19/2024    Procedure Summary       Date: 12/19/24 Room / Location: [email or web address]  / [email or web address]    Anesthesia Start: 0731 Anesthesia Stop: 0834    Procedure: Robotic Cholecystectomy Diagnosis:       Calculus of gallbladder with cholecystitis without biliary obstruction, unspecified cholecystitis acuity      (Calculus of gallbladder with cholecystitis without biliary obstruction, unspecified cholecystitis acuity [K80.10])    Surgeons: Jesus Chan MD Responsible Provider: Mario Calix DO    Anesthesia Type: general ASA Status: 2            Anesthesia Type: No value filed.    Jim Phase I: Jim Score: 10    Jim Phase II:      Anesthesia Post Evaluation    Patient location during evaluation: PACU  Patient participation: complete - patient participated  Level of consciousness: awake  Airway patency: patent  Nausea & Vomiting: no nausea  Cardiovascular status: hemodynamically stable  Respiratory status: acceptable  Hydration status: stable  Pain management: adequate    No notable events documented.

## 2024-12-19 NOTE — BRIEF OP NOTE
Brief Postoperative Note      Patient: Damian Vargas  YOB: 1960  MRN: [id number]    Date of Procedure: 12/19/2024    Pre-Op Diagnosis Codes:      * Calculus of gallbladder with cholecystitis without biliary obstruction, unspecified cholecystitis acuity [K80.10]    Post-Op Diagnosis: Same       Procedure(s):  Robotic Cholecystectomy    Surgeon(s):  Jesus Chna MD    Assistant:  First Assistant: Marco Adamson RN    Anesthesia: General/local    Estimated Blood Loss (mL): 5ml    Complications: None    Specimens:   ID Type Source Tests Collected by Time Destination   A : gallbladder Tissue Gallbladder SURGICAL PATHOLOGY Jesus Chan MD 12/19/2024 0806        Implants:  Implant Name Type Inv. Item Serial No.  Lot No. LRB No. Used Action   CLIP INT L POLYMER YAZMIN LIG HEM O YAZMIN (6EA/PK) - [id number]  CLIP INT L POLYMER YAZMIN LIG HEM O YAZMIN (6EA/PK)  [employer]-  N/A 1 Implanted         Drains: * No LDAs found *    Findings:  Infection Present At Time Of Surgery (PATOS) (choose all levels that have infection present):  No infection present    Other Findings: see op note    Electronically signed by Jesus Chan MD on 12/19/2024 at 8:25 AM

## 2024-12-19 NOTE — OP NOTE
[address] [address]                            OPERATIVE REPORT      PATIENT NAME: SHELDON JOEL                 : 1960  MED REC NO: [id number]                       ROOM: [facility]                                               (General) POOL                                               RM    ACCOUNT NO: [id number]                       ADMIT DATE: 2024  PROVIDER: Jesus Chan MD      DATE OF PROCEDURE:  2024    SURGEON:  Jesus Chan MD    PREOPERATIVE DIAGNOSIS:  Chronic calculous cholecystitis.    POSTOPERATIVE DIAGNOSIS:  Chronic calculous cholecystitis.    PROCEDURE PERFORMED:  Robotic laparoscopic cholecystectomy.    ASSISTANTS:    1. Jairon Adamson RN.  2. Taye Aaron CNP.    ANESTHESIA:  General/local.    ESTIMATED BLOOD LOSS:  5 mL.    DRAINS:  None.    COMPLICATIONS:  None.    DISPOSITION:  Stable to the recovery room.    INDICATIONS:  The patient is a 64-year-old male who I had seen in the office secondary to gallbladder disease.  Both operative and nonoperative intervention plans were discussed.  Risks of surgeries were further discussed.  Some of the risks included, but were not limited to bleeding, infection, the need for reoperation, severe chronic postoperative pain or numbness, major vascular or nerve injury, cardiopulmonary complications, anesthetic complications, seroma or hematoma formation, wound breakdown, trocar site herniation, bile leak, bile duct injury, biloma formation, chronic pain, and death.  After all of the questions were answered in their entirety and the patient was completely aware of the current situation, he elected to proceed with the procedure.    DESCRIPTION OF PROCEDURE:  After informed consent was signed and placed on the chart, the patient was taken back to the operating room and placed supine on the operating room table.  General anesthesia was induced.  He tolerated  with indocyanine green dye.  They were each then doubly clipped and divided close to the gallbladder.  The gallbladder was then dissected away from the liver bed using electrocautery.  Irrigation.  Hemostasis appeared to be adequate.  Irrigant return was clear.  Clips appeared to be intact.  No oozing of bile or blood from the cystic duct and artery stumps nor from the liver bed.  Gallbladder was then placed into an endoscopic retrieval bag.  Robot removed as well as the instruments.  I then scrubbed back into the case.  Gallbladder in the EndoCatch bag was removed and sent to Pathology for permanent.  Large trocar site was then closed at the fascia level with Vicryl suture and a Storz suture passer.  At the completion of this, there were no fascial defects.  The patient tolerated desufflation well.  Hemostasis was adequate.  Skin was reapproximated at all the incisional sites with a 4-0 Vicryl in a subcuticular fashion.  Closed incisions were then cleaned, dried, and glue placed.  Dry dressings placed.  Sponge, needle, and instrumentation count was correct at the end of the procedure.  The patient tolerated the procedure well with no apparent complications and less than 10 mL blood loss.  Brought out of general anesthesia and transferred to postanesthesia care unit in stable condition.          MADI NOLAND MD      D:  12/19/2024 08:30:20     T:  12/19/2024 08:41:13     JUNE/JOE  Job #:  [id number]     Doc#:  [id number]

## 2024-12-20 ENCOUNTER — TELEPHONE (OUTPATIENT)
Dept: SURGERY | Age: 64
End: 2024-12-20

## 2024-12-26 ENCOUNTER — OFFICE VISIT (OUTPATIENT)
Dept: INTERNAL MEDICINE CLINIC | Age: 64
End: 2024-12-26
Payer: COMMERCIAL

## 2024-12-26 VITALS
SYSTOLIC BLOOD PRESSURE: 148 MMHG | BODY MASS INDEX: 27.86 KG/M2 | WEIGHT: 194.6 LBS | HEIGHT: 70 IN | DIASTOLIC BLOOD PRESSURE: 82 MMHG | HEART RATE: 67 BPM | OXYGEN SATURATION: 99 %

## 2024-12-26 DIAGNOSIS — Z98.890 POST-OPERATIVE STATE: Primary | ICD-10-CM

## 2024-12-26 DIAGNOSIS — E10.9 TYPE 1 DIABETES MELLITUS WITHOUT COMPLICATION (HCC): ICD-10-CM

## 2024-12-26 DIAGNOSIS — E78.1 HYPERTRIGLYCERIDEMIA WITHOUT HYPERCHOLESTEROLEMIA: ICD-10-CM

## 2024-12-26 PROCEDURE — G8484 FLU IMMUNIZE NO ADMIN: HCPCS

## 2024-12-26 PROCEDURE — 1036F TOBACCO NON-USER: CPT

## 2024-12-26 PROCEDURE — G8417 CALC BMI ABV UP PARAM F/U: HCPCS

## 2024-12-26 PROCEDURE — 3044F HG A1C LEVEL LT 7.0%: CPT

## 2024-12-26 PROCEDURE — 2022F DILAT RTA XM EVC RTNOPTHY: CPT

## 2024-12-26 PROCEDURE — 99213 OFFICE O/P EST LOW 20 MIN: CPT

## 2024-12-26 PROCEDURE — 3017F COLORECTAL CA SCREEN DOC REV: CPT

## 2024-12-26 PROCEDURE — G8427 DOCREV CUR MEDS BY ELIG CLIN: HCPCS

## 2024-12-26 RX ORDER — ATORVASTATIN CALCIUM 40 MG/1
40 TABLET, FILM COATED ORAL DAILY
Qty: 30 TABLET | Refills: 3 | Status: SHIPPED | OUTPATIENT
Start: 2024-12-26

## 2024-12-26 NOTE — PATIENT INSTRUCTIONS
Start taking Lipitor 40mg daily.    Measure blood pressure 1-2 times daily at the same time each day and record them. Bring records to the next appointment.    Return to clinic at next appointment 2/05/2025.

## 2024-12-26 NOTE — PROGRESS NOTES
Obese.  Mental status - alert, oriented to person, place, and time  Head - atraumatic, normocephalic  Eyes - pupils equal and reactive to light, extraocular muscles intact  Ears - external ears are normal, hearing is grossly normal  Mouth - oral pharynx clear, mucous membranes moist  Neck - supple, no significant adenopathy  Chest - clear to auscultation, no wheezes, rales or rhonchi, symmetric air entry  Heart - normal rate, regular rhythm, normal S1, S2, no murmurs, rubs, clicks or gallops  Abdomen -soft, nondistended. RUQ and right flank ttp, no guarding nor rebound tenderness. Noted purpura in that location. Abd binder on.  Neurological - alert, oriented, cranial nerves II-XII intact, motor and sensation are grossly intact bilateral upper and lower extremities.  Extremities - peripheral pulses normal, no pedal edema, no clubbing or cyanosis  Skin - warm and dry      Diagnostic data:   I have reviewed recent diagnostic testing including labs, radiology, other provider documentation.      Assessment and Plan:     Diagnosis Orders   1. Post-operative state        2. Type 1 diabetes mellitus without complication (HCC)        3. Hypertriglyceridemia without hypercholesterolemia  atorvastatin (LIPITOR) 40 MG tablet          Post-Operative State: s/p Lap Amanda 12/19/2024 w/ Dr. Chan. Complicated by RUQ and right flank hematoma. Patient recovering well. Pain controlled; stopped pain meds 12/15/2024 evening. Adwoa PO diet w/o N/V, abd pain. Recommend continued abd binder use. Continue post-op care and f/u w/ GenSurg as instructed.  Controlled IDDMI: A1c 6.3 (12/04/2024). Home medications include Rybelsus 3mg daily. Foot exam up-to-date. Reminded to get annual eye. Resumed Rybelsus post-op w/o complications. Reinforce lifestyle modifications w/ diet, exercise.  Primary HTN: continue Coreg 6.25mg BID, cozaar 50mg daily, and norvasc 5mg daily. Suspect current uncontrolled BP 2/2 stress and post-surgical pain. Measure

## 2024-12-27 DIAGNOSIS — K80.20 CALCULUS OF GALLBLADDER WITHOUT CHOLECYSTITIS WITHOUT OBSTRUCTION: ICD-10-CM

## 2025-01-06 ENCOUNTER — OFFICE VISIT (OUTPATIENT)
Dept: SURGERY | Age: 65
End: 2025-01-06

## 2025-01-06 VITALS
OXYGEN SATURATION: 97 % | HEART RATE: 85 BPM | DIASTOLIC BLOOD PRESSURE: 64 MMHG | HEIGHT: 70 IN | BODY MASS INDEX: 26.77 KG/M2 | WEIGHT: 187 LBS | SYSTOLIC BLOOD PRESSURE: 122 MMHG | TEMPERATURE: 97.2 F

## 2025-01-06 DIAGNOSIS — Z90.49 S/P LAPAROSCOPIC CHOLECYSTECTOMY: Primary | ICD-10-CM

## 2025-01-06 PROCEDURE — 99024 POSTOP FOLLOW-UP VISIT: CPT | Performed by: NURSE PRACTITIONER

## 2025-01-06 RX ORDER — AMLODIPINE BESYLATE 5 MG/1
5 TABLET ORAL 2 TIMES DAILY
Qty: 30 TABLET | Refills: 5 | Status: SHIPPED | OUTPATIENT
Start: 2025-01-06

## 2025-01-06 ASSESSMENT — ENCOUNTER SYMPTOMS
VOMITING: 0
CHEST TIGHTNESS: 0
ALLERGIC/IMMUNOLOGIC NEGATIVE: 1
COUGH: 0
CHOKING: 0
COLOR CHANGE: 0
CONSTIPATION: 0
SINUS PRESSURE: 0
ABDOMINAL DISTENTION: 0
SORE THROAT: 0
ANAL BLEEDING: 0
DIARRHEA: 0
BLOOD IN STOOL: 0
PHOTOPHOBIA: 0
ABDOMINAL PAIN: 0
NAUSEA: 0
EYE DISCHARGE: 0
SHORTNESS OF BREATH: 0
APNEA: 0
WHEEZING: 0
RHINORRHEA: 0
BACK PAIN: 0
EYE ITCHING: 0
EYE PAIN: 0

## 2025-01-06 NOTE — PROGRESS NOTES
Assessment:     Status post robotic cholecystectomy    Plan:     Abdomen benign. Incisions are healing well without signs of infection. Continue wound care as directed.  Pathology reviewed and dicussed with patient. No questions.   Appetite and bowel function normal. Continue diet as tolerated.   Wear abdominal binder for comfort as needed  Off narcotics. Tylenol and/or Motrin as needed for discomfort.  Lifting/activity restrictions discussed with patient. Questions answered. Ok to return to work tomorrow with restrictions for 2 weeks. As of 11/20/25 ok to return to normal duties.  Follow up as needed. Signs and symptoms reviewed with patient that would be concerning and need him to return to office for re-evaluation.  Patient states he will call if he has questions or concerns.      Electronically signed by CAMILLE YEAGER CNP on 1/6/2025 at 1:45 PM

## 2025-02-05 ENCOUNTER — OFFICE VISIT (OUTPATIENT)
Dept: INTERNAL MEDICINE CLINIC | Age: 65
End: 2025-02-05

## 2025-02-05 VITALS
SYSTOLIC BLOOD PRESSURE: 136 MMHG | WEIGHT: 196 LBS | OXYGEN SATURATION: 98 % | BODY MASS INDEX: 28.12 KG/M2 | HEART RATE: 72 BPM | TEMPERATURE: 98 F | RESPIRATION RATE: 18 BRPM | DIASTOLIC BLOOD PRESSURE: 70 MMHG

## 2025-02-05 DIAGNOSIS — Z98.890 POST-OPERATIVE STATE: Primary | ICD-10-CM

## 2025-02-05 DIAGNOSIS — I10 PRIMARY HYPERTENSION: ICD-10-CM

## 2025-02-05 DIAGNOSIS — E78.1 HYPERTRIGLYCERIDEMIA WITHOUT HYPERCHOLESTEROLEMIA: ICD-10-CM

## 2025-02-05 RX ORDER — ATORVASTATIN CALCIUM 40 MG/1
40 TABLET, FILM COATED ORAL DAILY
Qty: 90 TABLET | Refills: 1 | Status: SHIPPED | OUTPATIENT
Start: 2025-02-05

## 2025-02-05 RX ORDER — ORAL SEMAGLUTIDE 3 MG/1
1 TABLET ORAL DAILY
COMMUNITY
Start: 2025-01-26

## 2025-02-05 RX ORDER — AMLODIPINE BESYLATE 5 MG/1
5 TABLET ORAL 2 TIMES DAILY
Qty: 180 TABLET | Refills: 1 | Status: SHIPPED | OUTPATIENT
Start: 2025-02-05

## 2025-02-05 ASSESSMENT — PATIENT HEALTH QUESTIONNAIRE - PHQ9
2. FEELING DOWN, DEPRESSED OR HOPELESS: NOT AT ALL
SUM OF ALL RESPONSES TO PHQ QUESTIONS 1-9: 0
SUM OF ALL RESPONSES TO PHQ QUESTIONS 1-9: 0
1. LITTLE INTEREST OR PLEASURE IN DOING THINGS: NOT AT ALL
SUM OF ALL RESPONSES TO PHQ QUESTIONS 1-9: 0
SUM OF ALL RESPONSES TO PHQ QUESTIONS 1-9: 0

## 2025-02-05 NOTE — PROGRESS NOTES
1960    Chief Complaint   Patient presents with    Diabetes    Hyperlipidemia    Hypertension     Presents for one month cholecystectomy followup and general medical followup. Reportedly had Right sided post op hematoma, that improved with abdominal wrap. Per surgical note, he is not requiring narcotics for pain control. Patient reports that he has had a few episodes of hypertension recently with systolics in the 180s, but only about 3 episodes. He denies any symptoms associated with his hypertension and that these episodes occurred before he took his BP meds. No other acute complaints or concerns at this time. Offered flu shot but declined.     Damian Vargas is a 64 y.o. male with a PMHx of IDDM and HTN who presented to [facility] on 8/20/24 with nausea, vomiting, diarrhea and jaundice. Was found to have cholestatic hepatitis secondary to cholelithiasis while inpatient. Patient was discharged on metformin, amlodipine, losartan, and carvedilol. He had a colonoscopy in October 2024, which revealed no polyps. He is S/P cholecystectomy in December 2024.       Past Medical History:   Diagnosis Date    Hypertension     Mild aortic stenosis 08/2024    T2DM (type 2 diabetes mellitus) (HCC) 08/2024    A1C 7.6    Transaminitis        Past Surgical History:   Procedure Laterality Date    CHOLECYSTECTOMY, LAPAROSCOPIC N/A 12/19/2024    Robotic Cholecystectomy performed by Jesus Chan MD at [facility] OR    HAND SURGERY Left        Current Outpatient Medications   Medication Sig Dispense Refill    OLIVE LEAF EXTRACT PO Take by mouth daily      RYBELSUS 3 MG TABS Take 1 tablet by mouth daily      amLODIPine (NORVASC) 5 MG tablet TAKE 1 TABLET BY MOUTH IN THE MORNING AND AT BEDTIME 30 tablet 5    atorvastatin (LIPITOR) 40 MG tablet Take 1 tablet by mouth daily 30 tablet 3    Multiple Vitamins-Minerals (THERAPEUTIC MULTIVITAMIN-MINERALS) tablet Take 1 tablet by mouth daily      turmeric (QC TUMERIC COMPLEX) 500

## 2025-03-24 ENCOUNTER — OFFICE VISIT (OUTPATIENT)
Dept: INTERNAL MEDICINE CLINIC | Age: 65
End: 2025-03-24
Payer: COMMERCIAL

## 2025-03-24 VITALS
HEIGHT: 70 IN | BODY MASS INDEX: 27.4 KG/M2 | HEART RATE: 73 BPM | WEIGHT: 191.4 LBS | DIASTOLIC BLOOD PRESSURE: 70 MMHG | SYSTOLIC BLOOD PRESSURE: 134 MMHG | TEMPERATURE: 98.1 F

## 2025-03-24 DIAGNOSIS — E10.9 TYPE 1 DIABETES MELLITUS WITHOUT COMPLICATION: Primary | ICD-10-CM

## 2025-03-24 LAB — HBA1C MFR BLD: 6.1 % (ref 4.3–5.7)

## 2025-03-24 PROCEDURE — G0108 DIAB MANAGE TRN  PER INDIV: HCPCS | Performed by: REGISTERED NURSE

## 2025-03-24 PROCEDURE — NBSRV NON-BILLABLE SERVICE: Performed by: REGISTERED NURSE

## 2025-03-24 PROCEDURE — 83036 HEMOGLOBIN GLYCOSYLATED A1C: CPT | Performed by: REGISTERED NURSE

## 2025-03-24 NOTE — PROGRESS NOTES
[facility]  [address] [place] [address]  [phone] (phone)  [phone] (fax)    Patient ID: Damian Vargas 1960  Referring Provider: Dr. Muro     Patient's name and  were verified.    Subjective:    He presents for a follow-up diabetic visit. He has type 2 diabetes mellitus. He is compliant some of the time.  Assessment:     Lab Results   Component Value Date/Time    LABA1C 6.1 2025 09:17 AM    LABA1C 7.6 2024 02:17 AM    BUN 29 2024 04:18 PM    CREATININE 0.9 2024 04:18 PM     Vitals:    25 0942   BP: 134/70   BP Site: Left Upper Arm   Patient Position: Sitting   Pulse: 73   Temp: 98.1 °F (36.7 °C)   Weight: 86.8 kg (191 lb 6.4 oz)   Height: 1.778 m (5' 10\")     Wt Readings from Last 3 Encounters:   25 86.8 kg (191 lb 6.4 oz)   25 88.9 kg (196 lb)   25 84.8 kg (187 lb)     Ht Readings from Last 3 Encounters:   25 1.778 m (5' 10\")   25 1.778 m (5' 10\")   24 1.778 m (5' 10\")     Est, Glom Filt Rate   Date Value Ref Range Status   2024 > 90 >60 ml/min/1.73m2 Final     Diabetes Pharmacotherapy:  Rybelsus 3mg daily    Glucose Trends:   Glucose at FBS today resulted at 119mg/dl  Current monitoring regimen: Freestyle Ildefonso 3 CGM - checks 4+ times daily  Home blood sugar trends:    -V. High: 0%, High: 6%, Target: 94%, Low: 0%, V. Low: 0%   -Average Glucose: 130mg/dL; GMI: 6.4%;  %time CGM active 84%              -Glucose levels are in good control with random glucose excursions with meals  Any episodes of hypoglycemia?    -Treats with na    Lifestyle Factors:   Previous visit with dietician: yes - 2024  Current diet: B: chicken sausage omelette; 2 toast; fruit bowl            L: catfish breaded/ green beans/ salad                       D: chipotle bowl                                 Chicken suzan/ m potato/ gravy/ peas/bread                       Snacks: random SF candy' nuts                       Beverages: 
Mazid [phone]

## 2025-03-24 NOTE — PATIENT INSTRUCTIONS
Make an appointment to get your eyes checked!!  Start your exercise program!        Start with the 10 minute chair workout you have found            *this would be great before going out for Sunday [facility]!        See if you can keep a minimum steps per day at 8,000                 Goal is 10,000 steps per day        Check out [employer] for every better work out program  Discuss Rybelsus dosing again with Dr. Muro at your next  Waking up: a glass of water and either breakfast or exercise

## 2025-04-14 ENCOUNTER — TELEPHONE (OUTPATIENT)
Dept: INTERNAL MEDICINE CLINIC | Age: 65
End: 2025-04-14

## 2025-04-14 NOTE — TELEPHONE ENCOUNTER
Patient called in requesting a new script for Rybelsus. He is only taking 3 mg and his BS is still running high. He is wanting to know if you will increase to 7 mg? He would like a refill for either the 3 mg or a new script for 7 mg be sent to [facility].

## 2025-04-15 RX ORDER — ORAL SEMAGLUTIDE 3 MG/1
1 TABLET ORAL DAILY
Qty: 30 TABLET | Refills: 0 | Status: SHIPPED | OUTPATIENT
Start: 2025-04-15

## 2025-04-30 ENCOUNTER — OFFICE VISIT (OUTPATIENT)
Dept: INTERNAL MEDICINE CLINIC | Age: 65
End: 2025-04-30
Payer: COMMERCIAL

## 2025-04-30 VITALS
HEIGHT: 70 IN | BODY MASS INDEX: 28.37 KG/M2 | DIASTOLIC BLOOD PRESSURE: 70 MMHG | OXYGEN SATURATION: 98 % | WEIGHT: 198.2 LBS | HEART RATE: 66 BPM | SYSTOLIC BLOOD PRESSURE: 134 MMHG

## 2025-04-30 DIAGNOSIS — I10 PRIMARY HYPERTENSION: Primary | ICD-10-CM

## 2025-04-30 PROCEDURE — 3075F SYST BP GE 130 - 139MM HG: CPT

## 2025-04-30 PROCEDURE — G8427 DOCREV CUR MEDS BY ELIG CLIN: HCPCS

## 2025-04-30 PROCEDURE — 99213 OFFICE O/P EST LOW 20 MIN: CPT

## 2025-04-30 PROCEDURE — 3078F DIAST BP <80 MM HG: CPT

## 2025-04-30 PROCEDURE — 3017F COLORECTAL CA SCREEN DOC REV: CPT

## 2025-04-30 PROCEDURE — 1036F TOBACCO NON-USER: CPT

## 2025-04-30 PROCEDURE — G8417 CALC BMI ABV UP PARAM F/U: HCPCS

## 2025-04-30 RX ORDER — AMLODIPINE BESYLATE 10 MG/1
10 TABLET ORAL DAILY
Qty: 31 EACH | Refills: 0 | Status: SHIPPED | OUTPATIENT
Start: 2025-04-30 | End: 2025-05-01

## 2025-04-30 ASSESSMENT — ENCOUNTER SYMPTOMS
SHORTNESS OF BREATH: 0
EYES NEGATIVE: 1
GASTROINTESTINAL NEGATIVE: 1
RESPIRATORY NEGATIVE: 1

## 2025-04-30 NOTE — PROGRESS NOTES
1960    Chief Complaint   Patient presents with    Diabetes     Discuss increasing rybelsus--after breakfast of a sausage egg and cheese mcmuffin BS spiked to 251.  Also is putting on weight.    Hypertension       Pt is a 65 y.o. male who presents for dose adjustment on his rybelsus. He currently takes 3 mg, but called office staff asking for a refill and an increase in dose, stating that his BS is still running high and would like his dose adjusted. Patient was instructed to make an appointment for further evaluation.     Patient reports that his BP has been running 160s systolic for the past three months. He denies any inciting factors to these readings and denies symptoms. Reports good blood glucose control. This provider counseled patient on eating strategies to help reduce his overall hunger. He has no acute complaints at this time besides his blood pressure.       Past Medical History:   Diagnosis Date    Hypertension     Mild aortic stenosis 08/2024    T2DM (type 2 diabetes mellitus) (HCC) 08/2024    A1C 7.6    Transaminitis        Past Surgical History:   Procedure Laterality Date    CHOLECYSTECTOMY, LAPAROSCOPIC N/A 12/19/2024    Robotic Cholecystectomy performed by Jesus Chan MD at [facility] OR    HAND SURGERY Left        Current Outpatient Medications   Medication Sig Dispense Refill    amLODIPine (NORVASC) 10 MG tablet Take 1 tablet by mouth daily 31 each 0    RYBELSUS 3 MG TABS Take 1 tablet by mouth daily 30 tablet 0    OLIVE LEAF EXTRACT PO Take by mouth daily      atorvastatin (LIPITOR) 40 MG tablet Take 1 tablet by mouth daily 90 tablet 1    Multiple Vitamins-Minerals (THERAPEUTIC MULTIVITAMIN-MINERALS) tablet Take 1 tablet by mouth daily      turmeric (QC TUMERIC COMPLEX) 500 MG CAPS Take 2 capsules by mouth daily      Continuous Glucose Sensor (FREESTYLE ANT 3 SENSOR) MISC Inject 1 Units into the skin every 14 days 6 each 3    carvedilol (COREG) 6.25 MG tablet Take 1 tablet by mouth 2

## 2025-05-01 ENCOUNTER — TELEPHONE (OUTPATIENT)
Dept: INTERNAL MEDICINE CLINIC | Age: 65
End: 2025-05-01

## 2025-05-01 DIAGNOSIS — I10 PRIMARY HYPERTENSION: ICD-10-CM

## 2025-05-01 RX ORDER — AMLODIPINE BESYLATE 10 MG/1
10 TABLET ORAL DAILY
Qty: 30 TABLET | Refills: 3 | Status: SHIPPED | OUTPATIENT
Start: 2025-05-01

## 2025-05-01 NOTE — TELEPHONE ENCOUNTER
Patient called in stating you increased Amlodipine to 10 mg BID (confirmed in progress note). You sent in a new Rx but didn't change the dose. Please resend script with correct dosage and sig.

## 2025-05-14 ENCOUNTER — OFFICE VISIT (OUTPATIENT)
Dept: INTERNAL MEDICINE CLINIC | Age: 65
End: 2025-05-14

## 2025-05-14 VITALS
HEART RATE: 68 BPM | DIASTOLIC BLOOD PRESSURE: 72 MMHG | HEIGHT: 70 IN | BODY MASS INDEX: 28.32 KG/M2 | WEIGHT: 197.8 LBS | TEMPERATURE: 98.1 F | SYSTOLIC BLOOD PRESSURE: 130 MMHG

## 2025-05-14 DIAGNOSIS — E10.9 TYPE 1 DIABETES MELLITUS WITHOUT COMPLICATION (HCC): Primary | ICD-10-CM

## 2025-05-14 DIAGNOSIS — I10 PRIMARY HYPERTENSION: ICD-10-CM

## 2025-05-14 DIAGNOSIS — E11.9 NEWLY DIAGNOSED DIABETES (HCC): ICD-10-CM

## 2025-05-14 DIAGNOSIS — E11.9 TYPE 2 DIABETES MELLITUS WITHOUT COMPLICATION, WITHOUT LONG-TERM CURRENT USE OF INSULIN (HCC): ICD-10-CM

## 2025-05-14 RX ORDER — AMLODIPINE BESYLATE 10 MG/1
10 TABLET ORAL 2 TIMES DAILY
Qty: 60 TABLET | Refills: 5 | Status: SHIPPED | OUTPATIENT
Start: 2025-05-14

## 2025-05-14 ASSESSMENT — ENCOUNTER SYMPTOMS
SHORTNESS OF BREATH: 0
GASTROINTESTINAL NEGATIVE: 1
RESPIRATORY NEGATIVE: 1
ALLERGIC/IMMUNOLOGIC NEGATIVE: 1
EYES NEGATIVE: 1

## 2025-05-14 NOTE — PROGRESS NOTES
Gastrointestinal: Negative.    Endocrine: Negative.    Genitourinary: Negative.    Musculoskeletal: Negative.    Allergic/Immunologic: Negative.    Neurological: Negative.    Hematological: Negative.    Psychiatric/Behavioral: Negative.          Blood pressure 130/72, pulse 68, temperature 98.1 °F (36.7 °C), height 1.778 m (5' 10\"), weight 89.7 kg (197 lb 12.8 oz).    Physical Exam  Constitutional:       General: He is not in acute distress.     Appearance: Normal appearance. He is obese. He is not ill-appearing.   Eyes:      Extraocular Movements: Extraocular movements intact.      Pupils: Pupils are equal, round, and reactive to light.   Cardiovascular:      Rate and Rhythm: Normal rate and regular rhythm.      Heart sounds: No murmur heard.     No friction rub. No gallop.   Pulmonary:      Effort: Pulmonary effort is normal.      Breath sounds: Normal breath sounds. No wheezing, rhonchi or rales.   Abdominal:      General: Abdomen is flat. Bowel sounds are normal.      Palpations: Abdomen is soft.      Tenderness: There is no abdominal tenderness.   Skin:     General: Skin is warm and dry.      Capillary Refill: Capillary refill takes less than 2 seconds.      Coloration: Skin is not jaundiced.   Neurological:      General: No focal deficit present.      Mental Status: He is alert and oriented to person, place, and time. Mental status is at baseline.      Cranial Nerves: No cranial nerve deficit.   Psychiatric:         Mood and Affect: Mood normal.         Behavior: Behavior normal.         Thought Content: Thought content normal.         Judgment: Judgment normal.          Diagnostic data:   I have reviewed recent diagnostic testing including: CBC, BMP      Assessment and Plan:     Diagnosis Orders   1. Type 1 diabetes mellitus without complication (HCC)  Tirzepatide (MOUNJARO) 2.5 MG/0.5ML SOAJ pen      2. Type 2 diabetes mellitus without complication, without long-term current use of insulin (HCC)

## 2025-05-21 DIAGNOSIS — I10 PRIMARY HYPERTENSION: ICD-10-CM

## 2025-05-21 RX ORDER — LOSARTAN POTASSIUM 50 MG/1
100 TABLET ORAL DAILY
Qty: 90 TABLET | Refills: 3 | Status: SHIPPED | OUTPATIENT
Start: 2025-05-21

## 2025-06-04 ENCOUNTER — OFFICE VISIT (OUTPATIENT)
Dept: INTERNAL MEDICINE CLINIC | Age: 65
End: 2025-06-04
Payer: COMMERCIAL

## 2025-06-04 VITALS
OXYGEN SATURATION: 98 % | HEART RATE: 67 BPM | DIASTOLIC BLOOD PRESSURE: 60 MMHG | WEIGHT: 197.4 LBS | SYSTOLIC BLOOD PRESSURE: 110 MMHG | HEIGHT: 70 IN | BODY MASS INDEX: 28.26 KG/M2

## 2025-06-04 DIAGNOSIS — I10 PRIMARY HYPERTENSION: ICD-10-CM

## 2025-06-04 DIAGNOSIS — E10.9 TYPE 1 DIABETES MELLITUS WITHOUT COMPLICATION (HCC): ICD-10-CM

## 2025-06-04 PROCEDURE — G8427 DOCREV CUR MEDS BY ELIG CLIN: HCPCS

## 2025-06-04 PROCEDURE — 3017F COLORECTAL CA SCREEN DOC REV: CPT

## 2025-06-04 PROCEDURE — 2022F DILAT RTA XM EVC RTNOPTHY: CPT

## 2025-06-04 PROCEDURE — 3044F HG A1C LEVEL LT 7.0%: CPT

## 2025-06-04 PROCEDURE — G8417 CALC BMI ABV UP PARAM F/U: HCPCS

## 2025-06-04 PROCEDURE — 3078F DIAST BP <80 MM HG: CPT

## 2025-06-04 PROCEDURE — 99213 OFFICE O/P EST LOW 20 MIN: CPT

## 2025-06-04 PROCEDURE — 1036F TOBACCO NON-USER: CPT

## 2025-06-04 PROCEDURE — 3074F SYST BP LT 130 MM HG: CPT

## 2025-06-04 RX ORDER — CARVEDILOL 6.25 MG/1
6.25 TABLET ORAL 2 TIMES DAILY WITH MEALS
Qty: 62 EACH | Refills: 1 | Status: SHIPPED | OUTPATIENT
Start: 2025-06-04

## 2025-06-04 ASSESSMENT — ENCOUNTER SYMPTOMS
PHOTOPHOBIA: 0
GASTROINTESTINAL NEGATIVE: 1
ABDOMINAL PAIN: 0
RESPIRATORY NEGATIVE: 1
VOMITING: 0
SHORTNESS OF BREATH: 0
ABDOMINAL DISTENTION: 0
EYES NEGATIVE: 1

## 2025-06-04 NOTE — PROGRESS NOTES
1960    Chief Complaint   Patient presents with    Diabetes    Hypertension         Pt presents for followup after starting mounjaro after stopping rybelsus. Reports \"much better BG control compared to rybelsus.\" He reports no GI side effects. Denies CP, SOB, N/V. ROS otherwise negative.     Diabetes  Pertinent negatives for diabetes include no chest pain and no fatigue.   Hypertension  Pertinent negatives include no chest pain or shortness of breath.         Past Medical History:   Diagnosis Date    Hypertension     Mild aortic stenosis 08/2024    T2DM (type 2 diabetes mellitus) (HCC) 08/2024    A1C 7.6    Transaminitis        Past Surgical History:   Procedure Laterality Date    CHOLECYSTECTOMY, LAPAROSCOPIC N/A 12/19/2024    Robotic Cholecystectomy performed by Jesus Chan MD at [facility] OR    HAND SURGERY Left        Current Outpatient Medications   Medication Sig Dispense Refill    carvedilol (COREG) 6.25 MG tablet Take 1 tablet by mouth 2 times daily (with meals) 62 each 1    losartan (COZAAR) 50 MG tablet Take 2 tablets by mouth daily 90 tablet 3    amLODIPine (NORVASC) 10 MG tablet Take 1 tablet by mouth in the morning and at bedtime 60 tablet 5    Tirzepatide (MOUNJARO) 2.5 MG/0.5ML SOAJ pen Inject 2.5 mg into the skin every 7 days 2 mL 0    OLIVE LEAF EXTRACT PO Take by mouth daily      atorvastatin (LIPITOR) 40 MG tablet Take 1 tablet by mouth daily 90 tablet 1    Multiple Vitamins-Minerals (THERAPEUTIC MULTIVITAMIN-MINERALS) tablet Take 1 tablet by mouth daily      turmeric (QC TUMERIC COMPLEX) 500 MG CAPS Take 2 capsules by mouth daily      Omega-3 Fatty Acids (FISH OIL BURP-LESS PO) Take 2 capsules by mouth daily      Continuous Glucose Sensor (FREESTYLE ANT 3 SENSOR) MISC Inject 1 Units into the skin every 14 days 6 each 3    Lancets MISC 1 each by Does not apply route 2 times daily 300 each 1    blood glucose monitor strips Test 1 time a day & as needed for symptoms of irregular blood

## 2025-06-06 DIAGNOSIS — E11.9 TYPE 2 DIABETES MELLITUS WITHOUT COMPLICATION, WITHOUT LONG-TERM CURRENT USE OF INSULIN (HCC): ICD-10-CM

## 2025-06-06 DIAGNOSIS — E10.9 TYPE 1 DIABETES MELLITUS WITHOUT COMPLICATION (HCC): ICD-10-CM

## 2025-06-06 DIAGNOSIS — E11.9 NEWLY DIAGNOSED DIABETES (HCC): ICD-10-CM

## 2025-06-06 NOTE — TELEPHONE ENCOUNTER
Pt was told at 6/4/25 appt that you were going to send in a 6 month supply of Mounjaro 2.5 mg no refills were sent in. Please refill!

## (undated) DEVICE — KIT IMAGING SYS RIGID W/SNGL USE KITX6 FLUORESCENT F/ENDOSCOPE PINPOINT DISP SPY-MIS PACK

## (undated) DEVICE — SUTURE VICRYL + SZ 0 L27IN ABSRB VLT L26MM UR-6 5/8 CIR VCP603H

## (undated) DEVICE — BLADELESS OBTURATOR: Brand: WECK VISTA

## (undated) DEVICE — BAG SPEC REM 224ML W4XL6IN DIA10MM 1 HND GYN DISP ENDOPCH

## (undated) DEVICE — SEAL

## (undated) DEVICE — GLOVE ORANGE PI 7   MSG9070

## (undated) DEVICE — GOWN,SIRUS,NON REINFRCD,LARGE,SET IN SL: Brand: MEDLINE

## (undated) DEVICE — BASIC SINGLE BASIN BTC-LF: Brand: MEDLINE INDUSTRIES, INC.

## (undated) DEVICE — TROCAR: Brand: KII FIOS FIRST ENTRY

## (undated) DEVICE — GENERAL LAPAROSCOPY-LF: Brand: MEDLINE INDUSTRIES, INC.

## (undated) DEVICE — ARM DRAPE

## (undated) DEVICE — GLOVE ORANGE PI 8   MSG9080

## (undated) DEVICE — COLUMN DRAPE

## (undated) DEVICE — LIQUIBAND RAPID ADHESIVE 36/CS 0.8ML: Brand: MEDLINE

## (undated) DEVICE — REDUCER: Brand: ENDOWRIST

## (undated) DEVICE — ELECTRO LUBE IS A SINGLE PATIENT USE DEVICE THAT IS INTENDED TO BE USED ON ELECTROSURGICAL ELECTRODES TO REDUCE STICKING.: Brand: KEY SURGICAL ELECTRO LUBE

## (undated) DEVICE — INSUFFLATION NEEDLE TO ESTABLISH PNEUMOPERITONEUM.: Brand: INSUFFLATION NEEDLE

## (undated) DEVICE — PUMP SUC IRR TBNG L10FT W/ HNDPC ASSEMB STRYKEFLOW 2

## (undated) DEVICE — TUBING INSUFFLATOR HEAT HUMIDIFIED SMK EVAC SET PNEUMOCLEAR